# Patient Record
Sex: FEMALE | Race: WHITE | NOT HISPANIC OR LATINO | URBAN - METROPOLITAN AREA
[De-identification: names, ages, dates, MRNs, and addresses within clinical notes are randomized per-mention and may not be internally consistent; named-entity substitution may affect disease eponyms.]

---

## 2017-08-13 ENCOUNTER — INPATIENT (INPATIENT)
Facility: HOSPITAL | Age: 57
LOS: 6 days | Discharge: HOME | End: 2017-08-20
Attending: SURGERY

## 2017-08-13 DIAGNOSIS — Z98.890 OTHER SPECIFIED POSTPROCEDURAL STATES: ICD-10-CM

## 2017-08-13 DIAGNOSIS — F41.9 ANXIETY DISORDER, UNSPECIFIED: ICD-10-CM

## 2017-08-13 DIAGNOSIS — K80.21 CALCULUS OF GALLBLADDER WITHOUT CHOLECYSTITIS WITH OBSTRUCTION: ICD-10-CM

## 2017-08-21 ENCOUNTER — TRANSCRIPTION ENCOUNTER (OUTPATIENT)
Age: 57
End: 2017-08-21

## 2017-08-21 PROBLEM — Z00.00 ENCOUNTER FOR PREVENTIVE HEALTH EXAMINATION: Status: ACTIVE | Noted: 2017-08-21

## 2017-08-23 DIAGNOSIS — E03.9 HYPOTHYROIDISM, UNSPECIFIED: ICD-10-CM

## 2017-08-23 DIAGNOSIS — K80.64 CALCULUS OF GALLBLADDER AND BILE DUCT WITH CHRONIC CHOLECYSTITIS WITHOUT OBSTRUCTION: ICD-10-CM

## 2017-08-23 DIAGNOSIS — K80.65 CALCULUS OF GALLBLADDER AND BILE DUCT WITH CHRONIC CHOLECYSTITIS WITH OBSTRUCTION: ICD-10-CM

## 2017-08-23 DIAGNOSIS — F41.9 ANXIETY DISORDER, UNSPECIFIED: ICD-10-CM

## 2017-08-23 DIAGNOSIS — Z72.0 TOBACCO USE: ICD-10-CM

## 2017-08-23 DIAGNOSIS — E66.01 MORBID (SEVERE) OBESITY DUE TO EXCESS CALORIES: ICD-10-CM

## 2017-08-24 DIAGNOSIS — K80.20 CALCULUS OF GALLBLADDER WITHOUT CHOLECYSTITIS WITHOUT OBSTRUCTION: ICD-10-CM

## 2017-08-25 ENCOUNTER — APPOINTMENT (OUTPATIENT)
Dept: SURGERY | Facility: CLINIC | Age: 57
End: 2017-08-25
Payer: COMMERCIAL

## 2017-08-25 VITALS
WEIGHT: 247 LBS | BODY MASS INDEX: 46.63 KG/M2 | DIASTOLIC BLOOD PRESSURE: 82 MMHG | HEIGHT: 61 IN | SYSTOLIC BLOOD PRESSURE: 156 MMHG

## 2017-08-25 PROCEDURE — 99024 POSTOP FOLLOW-UP VISIT: CPT

## 2018-12-09 ENCOUNTER — EMERGENCY (EMERGENCY)
Facility: HOSPITAL | Age: 58
LOS: 0 days | Discharge: HOME | End: 2018-12-09
Admitting: PHYSICIAN ASSISTANT

## 2018-12-09 VITALS
OXYGEN SATURATION: 98 % | DIASTOLIC BLOOD PRESSURE: 60 MMHG | RESPIRATION RATE: 16 BRPM | SYSTOLIC BLOOD PRESSURE: 125 MMHG | HEART RATE: 71 BPM | TEMPERATURE: 97 F

## 2018-12-09 VITALS
OXYGEN SATURATION: 96 % | RESPIRATION RATE: 20 BRPM | HEART RATE: 84 BPM | SYSTOLIC BLOOD PRESSURE: 176 MMHG | TEMPERATURE: 98 F | DIASTOLIC BLOOD PRESSURE: 75 MMHG
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
## 2018-12-09 DIAGNOSIS — T49.4X5A ADVERSE EFFECT OF KERATOLYTICS, KERATOPLASTICS, AND OTHER HAIR TREATMENT DRUGS AND PREPARATIONS, INITIAL ENCOUNTER: ICD-10-CM
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
## 2018-12-09 DIAGNOSIS — I10 ESSENTIAL (PRIMARY) HYPERTENSION: ICD-10-CM
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
## 2018-12-09 DIAGNOSIS — R21 RASH AND OTHER NONSPECIFIC SKIN ERUPTION: ICD-10-CM

## 2018-12-09 RX ORDER — FAMOTIDINE 10 MG/ML
20 INJECTION INTRAVENOUS DAILY
Qty: 0 | Refills: 0 | Status: DISCONTINUED | OUTPATIENT
Start: 2018-12-09 | End: 2018-12-09

## 2018-12-09 RX ORDER — DIPHENHYDRAMINE HCL 50 MG
50 CAPSULE ORAL EVERY 6 HOURS
Qty: 0 | Refills: 0 | Status: DISCONTINUED | OUTPATIENT
Start: 2018-12-09 | End: 2018-12-09

## 2018-12-09 RX ORDER — FAMOTIDINE 10 MG/ML
1 INJECTION INTRAVENOUS
Qty: 10 | Refills: 0
Start: 2018-12-09 | End: 2018-12-18

## 2018-12-09 RX ADMIN — FAMOTIDINE 20 MILLIGRAM(S): 10 INJECTION INTRAVENOUS at 22:40

## 2018-12-09 RX ADMIN — Medication 60 MILLIGRAM(S): at 22:40

## 2018-12-09 RX ADMIN — Medication 50 MILLIGRAM(S): at 22:40

## 2018-12-09 NOTE — ED PROVIDER NOTE - NS ED ROS FT
Constitutional: no fever, chills, no recent weight loss, change in appetite or malaise  Eyes: no redness/discharge/pain/vision changes  ENT: no rhinorrhea/ear pain/sore throat  Cardiac: No chest pain, SOB or edema.  Respiratory: No cough or respiratory distress  GI: No nausea, vomiting, diarrhea or abdominal pain.  : No dysuria, frequency, urgency or hematuria  MS: no pain to back or extremities, no loss of ROM, no weakness  Neuro: No headache or weakness. No LOC.  Skin: see hpi  Except as documented in the HPI, all other systems are negative.

## 2018-12-09 NOTE — ED ADULT NURSE NOTE - OBJECTIVE STATEMENT
Pt states she dyed her hair yesterday morning and every since she has been itchy. Pt presents with a red blotchy rash throughout her body and blisters on her scalp.

## 2018-12-09 NOTE — ED PROVIDER NOTE - NSFOLLOWUPINSTRUCTIONS_ED_ALL_ED_FT
Rash    A rash is a change in the color of the skin. A rash can also change the way your skin feels. There are many different conditions and factors that can cause a rash, most of which are not dangerous. Make sure to follow up with your primary care physician or a dermatologist as instructed by your health care provider.    SEEK IMMEDIATE MEDICAL CARE IF YOU HAVE ANY OF THE FOLLOWING SYMPTOMS: fever, blisters, a rash inside your mouth, vaginal or anal pain, or altered mental status.      Allergic Reaction    An allergic reaction is an abnormal reaction to a substance (allergen) by the body's defense system. Common allergens include medicines, food, insect bites or stings, and blood products. The body releases certain proteins into the blood that can cause a variety of symptoms such as an itchy rash, wheezing, swelling of the face/lips/tongue/throat, abdominal pain, nausea or vomiting. An allergic reaction is usually treated with medication. If your health care provider prescribed you an epinephrine injection device, make sure to keep it with you at all times.    SEEK IMMEDIATE MEDICAL CARE IF YOU HAVE THE FOLLOWING SYMPTOMS: allergic reaction severe enough that required you to use epinephrine, tightness in your chest, swelling around your lips/tongue/throat, abdominal pain, vomiting or diarrhea, or lightheadedness/dizziness. These symptoms may represent a serious problem that is an emergency. Do not wait to see if the symptoms will go away. Use your auto-injector pen or anaphylaxis kit as you have been instructed, and get medical help right away. Call your local emergency services (911 in the [place] Do not drive yourself to the hospital.

## 2018-12-09 NOTE — ED PROVIDER NOTE - PROGRESS NOTE DETAILS
side effects of steroids discussed. risk for GI upset. PUD, gerd, gastrities, bleeding explained.  take steroid w food, may use otc prilosec if GI upset.  d/c use if intolerable s/e , ie pain, psychosis  Well appearing, non-toxic appearance with no evidence to suggest a more serious acute issue other than findings.  no indication for epi based on exam . The treatment plan and instructions for after care were reviewed prior to office discharge, pt verbalized understanding of plan of care, proper use of prescription /OTC meds (prednisone, pepcid and benadryl) and reasons to return to office or seek additional medical evaluation for ongoing or worsening complaints

## 2018-12-09 NOTE — ED ADULT NURSE NOTE - NSIMPLEMENTINTERV_GEN_ALL_ED
Implemented All Universal Safety Interventions:  [place] to call system. Call bell, personal items and telephone within reach. Instruct patient to call for assistance. Room bathroom lighting operational. Non-slip footwear when patient is off stretcher. Physically safe environment: no spills, clutter or unnecessary equipment. Stretcher in lowest position, wheels locked, appropriate side rails in place.

## 2018-12-09 NOTE — ED PROVIDER NOTE - CARE PROVIDER_API CALL
Kishore Parra), Dermatology; Internal Medicine  [address] [place] [address]  Phone: [phone]  Fax: [phone]    Blank Worley), Allergy and Immunology; Internal Medicine  [address] [place] [address]  Phone: [phone]  Fax: [phone]

## 2018-12-09 NOTE — ED PROVIDER NOTE - OBJECTIVE STATEMENT
allergic rxn poss due to hair dye  used new hair dye otc yesterday, has been having itchy rash to scalp and now entire body  Denies fever/chill/HA/dizziness/chest pain/palpitation/sob/abd pain/n/v/d/ black stool/bloody stool/urinary sxs

## 2018-12-09 NOTE — ED PROVIDER NOTE - PHYSICAL EXAMINATION
CONSTITUTIONAL: Well-appearing; well-nourished; in no apparent distress.   EYES: PERRL; EOM intact.   ENT: normal nose; no rhinorrhea; normal pharynx with no tonsillar hypertrophy.   CARDIOVASCULAR: Normal S1, S2; no murmurs, rubs, or gallops.   RESPIRATORY: Normal chest excursion with respiration; breath sounds clear and equal bilaterally; no wheezes, rhonchi, or rales.  GI/: Normal bowel sounds; non-distended; non-tender; no palpable organomegaly.   SKIN: diffuse urticarial rash to body and scalp; otherwise normal for age and race; warm; dry; good turgor  NEURO/PSYCH: A & O x 4; grossly unremarkable. mood and manner are appropriate. Grooming and personal hygiene are appropriate. No apparent thoughts of harm to self or others.

## 2018-12-17 ENCOUNTER — EMERGENCY (EMERGENCY)
Facility: HOSPITAL | Age: 58
LOS: 0 days | Discharge: HOME | End: 2018-12-17
Attending: EMERGENCY MEDICINE | Admitting: EMERGENCY MEDICINE

## 2018-12-17 VITALS
SYSTOLIC BLOOD PRESSURE: 196 MMHG | TEMPERATURE: 97 F | HEART RATE: 85 BPM | OXYGEN SATURATION: 99 % | DIASTOLIC BLOOD PRESSURE: 92 MMHG | RESPIRATION RATE: 17 BRPM

## 2018-12-17 DIAGNOSIS — Z87.891 PERSONAL HISTORY OF NICOTINE DEPENDENCE: ICD-10-CM

## 2018-12-17 DIAGNOSIS — R21 RASH AND OTHER NONSPECIFIC SKIN ERUPTION: ICD-10-CM

## 2018-12-17 DIAGNOSIS — L73.9 FOLLICULAR DISORDER, UNSPECIFIED: ICD-10-CM

## 2018-12-17 DIAGNOSIS — Z79.899 OTHER LONG TERM (CURRENT) DRUG THERAPY: ICD-10-CM

## 2018-12-17 NOTE — ED PROVIDER NOTE - PHYSICAL EXAMINATION
VITAL SIGNS: I have reviewed nursing notes and confirm.  CONSTITUTIONAL: Well-developed; well-nourished; in no acute distress. pt comfortable.  SKIN+ Scalp folliculitis, no edema, no pustular discharge, no forehead or MM involvement, remainder of skin exam is warm and dry, no acute rash.  HEAD: Normocephalic; atraumatic.  EYES:  EOM intact; conjunctiva and sclera clear.  ENT: No nasal discharge; airway clear. moist oral mucosa;   NECK: Supple; non tender.  CARD: S1, S2 normal; no murmurs, gallops, or rubs. Regular rate and rhythm. posterior tibial and radial pulses 2+  RESP: No wheezes, rales or rhonchi. cta b/l. no use of accessory muscles. no retractions  EXT: Normal ROM. No  cyanosis or edema.  LYMPH: No acute cervical adenopathy.  NEURO: Alert, oriented, grossly unremarkable.    PSYCH: Cooperative, appropriate.

## 2018-12-17 NOTE — ED PROVIDER NOTE - OBJECTIVE STATEMENT
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 No cp/sob, no n/v/d, no fever, no loc. Patient has an appointment with dermatologist again on Wednesday. patient has been give Minocycline.

## 2018-12-17 NOTE — ED PROVIDER NOTE - NS ED ROS FT
Constitutional: (-) fever  Eyes/ENT: (-) blurry vision, (-) epistaxis  Cardiovascular: (-) chest pain, (-) syncope  Respiratory: (-) cough, (-) shortness of breath  Gastrointestinal: (-) vomiting, (-) diarrhea  Musculoskeletal: (-) neck pain, (-) back pain, (-) joint pain  Integumentary: + rash, (-) edema  Neurological: (-) headache, (-) altered mental status  Psychiatric: (-) hallucinations  Allergic/Immunologic: (-) pruritus

## 2018-12-17 NOTE — ED PROVIDER NOTE - ATTENDING CONTRIBUTION TO CARE
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 No cp/sob, no n/v/d, no fever, no loc. Patient has an appointment with dermatologist again on Wednesday. patient has been give Minocycline.     CONSTITUTIONAL: Well-developed; well-nourished; in no acute distress. Sitting up and providing appropriate history and physical examination  SKIN: + Scalp folliculitis, no edema, no pustular discharge, no forehead or MM involvement, remainder of skin exam is warm and dry, no acute rash.  HEAD: Normocephalic; atraumatic.  EYES: PERRL, 3 mm bilateral, no nystagmus, EOM intact; conjunctiva and sclera clear.  ENT: No nasal discharge; airway clear.  NECK: Supple; non tender. + full passive ROM in all directions. No JVD  CARD: S1, S2 normal; no murmurs, gallops, or rubs. Regular rate and rhythm. + Symmetric Strong Pulses  RESP: No wheezes, rales or rhonchi. Good air movement bilaterally  ABD: soft; non-distended; non-tender. No Rebound, No Guarding, No signs of peritonitis, No CVA tenderness. No pulsatile abdominal mass. + Strong and Symmetric Pulses  EXT: Normal ROM. No clubbing, cyanosis or edema. Dp and Pt Pulses intact. Cap refill less than 3 seconds  NEURO: CN 2-12 intact, normal finger to nose, normal romberg, stable gait, no sensory or motor deficits, Alert, oriented, grossly unremarkable. No Focal deficits. GCS 15. NIH 0      Patient will continue with treatment and will follow with dermatologist on Wedsnday.   I have fully discussed the medical management and delivery of care with the patient. I have discussed any available labs, imaging and treatment options with the patient. Patient confirms understanding and has been given detailed return precautions. Patient instructed to return to the ED should symptoms persist or worsen. Patient has demonstrated capacity and has verbalized understanding. Patient is well appearing upon discharge.

## 2018-12-17 NOTE — ED PROVIDER NOTE - NSFOLLOWUPINSTRUCTIONS_ED_ALL_ED_FT
Patient to be discharged from ED. Any available test results were discussed with patient and/or family. Verbal instructions given, including instructions to return to ED immediately for any new, worsening, or concerning symptoms. Patient endorsed understanding. Written discharge instructions additionally given, including follow-up plan.    Please follow up with your dermatologist.

## 2018-12-18 PROBLEM — I10 ESSENTIAL (PRIMARY) HYPERTENSION: Chronic | Status: ACTIVE | Noted: 2018-12-09

## 2019-05-13 ENCOUNTER — TRANSCRIPTION ENCOUNTER (OUTPATIENT)
Age: 59
End: 2019-05-13

## 2019-05-14 ENCOUNTER — APPOINTMENT (OUTPATIENT)
Age: 59
End: 2019-05-14
Payer: COMMERCIAL

## 2019-05-14 VITALS — BODY MASS INDEX: 47.24 KG/M2 | WEIGHT: 250 LBS | DIASTOLIC BLOOD PRESSURE: 85 MMHG | SYSTOLIC BLOOD PRESSURE: 135 MMHG

## 2019-05-14 DIAGNOSIS — R10.32 LEFT LOWER QUADRANT PAIN: ICD-10-CM

## 2019-05-14 PROCEDURE — 99213 OFFICE O/P EST LOW 20 MIN: CPT

## 2019-05-14 RX ORDER — SULFAMETHOXAZOLE AND TRIMETHOPRIM 400; 80 MG/1; MG/1
400-80 TABLET ORAL
Refills: 0 | Status: ACTIVE | COMMUNITY

## 2019-05-14 NOTE — PLAN
[FreeTextEntry1] : Patient has left groin pain secondary to infection in the groin and cellulitis. Patient has 2 openings in that area and recently the abscess opened up and drained. She was seen in an outpatient facility where cultures were taken and patient was placed on Bactrim. On examination there are 2 small openings that are draining. There is no collection of pus underneath. Patient was advised to continue local care and Bactrim. She is also going to see infectious disease specialist. Followup in the office as needed.

## 2019-05-14 NOTE — REASON FOR VISIT
[Consultation] : a consultation visit [Follow-Up] : a follow-up visit [FreeTextEntry1] : Marianna is being seen today for an infected mass

## 2019-05-14 NOTE — REVIEW OF SYSTEMS
[Chills] : no chills [Fever] : no fever [Chest Pain] : no chest pain [Shortness Of Breath] : no shortness of breath [Abdominal Pain] : no abdominal pain [Vomiting] : no vomiting [Constipation] : no constipation [Dizziness] : no dizziness [Diarrhea] : no diarrhea

## 2019-05-14 NOTE — REVIEW OF SYSTEMS
[Chills] : no chills [Fever] : no fever [Shortness Of Breath] : no shortness of breath [Chest Pain] : no chest pain [Abdominal Pain] : no abdominal pain [Vomiting] : no vomiting [Constipation] : no constipation [Diarrhea] : no diarrhea [Dizziness] : no dizziness

## 2020-09-01 ENCOUNTER — TRANSCRIPTION ENCOUNTER (OUTPATIENT)
Age: 60
End: 2020-09-01

## 2020-09-26 ENCOUNTER — EMERGENCY (EMERGENCY)
Facility: HOSPITAL | Age: 60
LOS: 0 days | Discharge: HOME | End: 2020-09-26
Attending: EMERGENCY MEDICINE | Admitting: EMERGENCY MEDICINE
Payer: COMMERCIAL

## 2020-09-26 VITALS
TEMPERATURE: 99 F | DIASTOLIC BLOOD PRESSURE: 75 MMHG | HEART RATE: 82 BPM | OXYGEN SATURATION: 96 % | SYSTOLIC BLOOD PRESSURE: 171 MMHG | RESPIRATION RATE: 16 BRPM

## 2020-09-26 DIAGNOSIS — Z79.52 LONG TERM (CURRENT) USE OF SYSTEMIC STEROIDS: ICD-10-CM

## 2020-09-26 DIAGNOSIS — Z79.899 OTHER LONG TERM (CURRENT) DRUG THERAPY: ICD-10-CM

## 2020-09-26 DIAGNOSIS — K61.0 ANAL ABSCESS: ICD-10-CM

## 2020-09-26 DIAGNOSIS — K62.89 OTHER SPECIFIED DISEASES OF ANUS AND RECTUM: ICD-10-CM

## 2020-09-26 DIAGNOSIS — I10 ESSENTIAL (PRIMARY) HYPERTENSION: ICD-10-CM

## 2020-09-26 DIAGNOSIS — E03.9 HYPOTHYROIDISM, UNSPECIFIED: ICD-10-CM

## 2020-09-26 DIAGNOSIS — F17.200 NICOTINE DEPENDENCE, UNSPECIFIED, UNCOMPLICATED: ICD-10-CM

## 2020-09-26 DIAGNOSIS — K59.00 CONSTIPATION, UNSPECIFIED: ICD-10-CM

## 2020-09-26 LAB
ALBUMIN SERPL ELPH-MCNC: 4.2 G/DL — SIGNIFICANT CHANGE UP (ref 3.5–5.2)
ALP SERPL-CCNC: 130 U/L — HIGH (ref 30–115)
ALT FLD-CCNC: 22 U/L — SIGNIFICANT CHANGE UP (ref 0–41)
ANION GAP SERPL CALC-SCNC: 10 MMOL/L — SIGNIFICANT CHANGE UP (ref 7–14)
APPEARANCE UR: CLEAR — SIGNIFICANT CHANGE UP
APTT BLD: 33.3 SEC — SIGNIFICANT CHANGE UP (ref 27–39.2)
AST SERPL-CCNC: 33 U/L — SIGNIFICANT CHANGE UP (ref 0–41)
BACTERIA # UR AUTO: NEGATIVE — SIGNIFICANT CHANGE UP
BASOPHILS # BLD AUTO: 0.06 K/UL — SIGNIFICANT CHANGE UP (ref 0–0.2)
BASOPHILS NFR BLD AUTO: 0.5 % — SIGNIFICANT CHANGE UP (ref 0–1)
BILIRUB SERPL-MCNC: 0.4 MG/DL — SIGNIFICANT CHANGE UP (ref 0.2–1.2)
BILIRUB UR-MCNC: NEGATIVE — SIGNIFICANT CHANGE UP
BLD GP AB SCN SERPL QL: SIGNIFICANT CHANGE UP
BUN SERPL-MCNC: 21 MG/DL — HIGH (ref 10–20)
CALCIUM SERPL-MCNC: 9.3 MG/DL — SIGNIFICANT CHANGE UP (ref 8.5–10.1)
CHLORIDE SERPL-SCNC: 96 MMOL/L — LOW (ref 98–110)
CO2 SERPL-SCNC: 26 MMOL/L — SIGNIFICANT CHANGE UP (ref 17–32)
COLOR SPEC: SIGNIFICANT CHANGE UP
CREAT SERPL-MCNC: 1 MG/DL — SIGNIFICANT CHANGE UP (ref 0.7–1.5)
DIFF PNL FLD: ABNORMAL
EOSINOPHIL # BLD AUTO: 0.04 K/UL — SIGNIFICANT CHANGE UP (ref 0–0.7)
EOSINOPHIL NFR BLD AUTO: 0.3 % — SIGNIFICANT CHANGE UP (ref 0–8)
EPI CELLS # UR: 2 /HPF — SIGNIFICANT CHANGE UP (ref 0–5)
GLUCOSE SERPL-MCNC: 112 MG/DL — HIGH (ref 70–99)
GLUCOSE UR QL: NEGATIVE — SIGNIFICANT CHANGE UP
HCT VFR BLD CALC: 41.4 % — SIGNIFICANT CHANGE UP (ref 37–47)
HGB BLD-MCNC: 13.4 G/DL — SIGNIFICANT CHANGE UP (ref 12–16)
HYALINE CASTS # UR AUTO: 1 /LPF — SIGNIFICANT CHANGE UP (ref 0–7)
IMM GRANULOCYTES NFR BLD AUTO: 0.5 % — HIGH (ref 0.1–0.3)
INR BLD: 1.03 RATIO — SIGNIFICANT CHANGE UP (ref 0.65–1.3)
KETONES UR-MCNC: NEGATIVE — SIGNIFICANT CHANGE UP
LACTATE SERPL-SCNC: 0.8 MMOL/L — SIGNIFICANT CHANGE UP (ref 0.7–2)
LEUKOCYTE ESTERASE UR-ACNC: NEGATIVE — SIGNIFICANT CHANGE UP
LYMPHOCYTES # BLD AUTO: 1.64 K/UL — SIGNIFICANT CHANGE UP (ref 1.2–3.4)
LYMPHOCYTES # BLD AUTO: 13.1 % — LOW (ref 20.5–51.1)
MCHC RBC-ENTMCNC: 28.9 PG — SIGNIFICANT CHANGE UP (ref 27–31)
MCHC RBC-ENTMCNC: 32.4 G/DL — SIGNIFICANT CHANGE UP (ref 32–37)
MCV RBC AUTO: 89.2 FL — SIGNIFICANT CHANGE UP (ref 81–99)
MONOCYTES # BLD AUTO: 1.18 K/UL — HIGH (ref 0.1–0.6)
MONOCYTES NFR BLD AUTO: 9.4 % — HIGH (ref 1.7–9.3)
NEUTROPHILS # BLD AUTO: 9.58 K/UL — HIGH (ref 1.4–6.5)
NEUTROPHILS NFR BLD AUTO: 76.2 % — HIGH (ref 42.2–75.2)
NITRITE UR-MCNC: NEGATIVE — SIGNIFICANT CHANGE UP
NRBC # BLD: 0 /100 WBCS — SIGNIFICANT CHANGE UP (ref 0–0)
PH UR: 6 — SIGNIFICANT CHANGE UP (ref 5–8)
PLATELET # BLD AUTO: 284 K/UL — SIGNIFICANT CHANGE UP (ref 130–400)
POTASSIUM SERPL-MCNC: 5.1 MMOL/L — HIGH (ref 3.5–5)
POTASSIUM SERPL-SCNC: 5.1 MMOL/L — HIGH (ref 3.5–5)
PROT SERPL-MCNC: 6.9 G/DL — SIGNIFICANT CHANGE UP (ref 6–8)
PROT UR-MCNC: NEGATIVE — SIGNIFICANT CHANGE UP
PROTHROM AB SERPL-ACNC: 11.9 SEC — SIGNIFICANT CHANGE UP (ref 9.95–12.87)
RBC # BLD: 4.64 M/UL — SIGNIFICANT CHANGE UP (ref 4.2–5.4)
RBC # FLD: 14.9 % — HIGH (ref 11.5–14.5)
RBC CASTS # UR COMP ASSIST: 7 /HPF — HIGH (ref 0–4)
SODIUM SERPL-SCNC: 132 MMOL/L — LOW (ref 135–146)
SP GR SPEC: 1.01 — SIGNIFICANT CHANGE UP (ref 1.01–1.03)
UROBILINOGEN FLD QL: SIGNIFICANT CHANGE UP
WBC # BLD: 12.56 K/UL — HIGH (ref 4.8–10.8)
WBC # FLD AUTO: 12.56 K/UL — HIGH (ref 4.8–10.8)
WBC UR QL: 3 /HPF — SIGNIFICANT CHANGE UP (ref 0–5)

## 2020-09-26 PROCEDURE — 93010 ELECTROCARDIOGRAM REPORT: CPT

## 2020-09-26 PROCEDURE — 99285 EMERGENCY DEPT VISIT HI MDM: CPT

## 2020-09-26 RX ORDER — POLYETHYLENE GLYCOL 3350 17 G/17G
17 POWDER, FOR SOLUTION ORAL
Qty: 119 | Refills: 0
Start: 2020-09-26 | End: 2020-10-02

## 2020-09-26 RX ORDER — MORPHINE SULFATE 50 MG/1
4 CAPSULE, EXTENDED RELEASE ORAL ONCE
Refills: 0 | Status: DISCONTINUED | OUTPATIENT
Start: 2020-09-26 | End: 2020-09-26

## 2020-09-26 RX ORDER — ACETAMINOPHEN WITH CODEINE 300MG-30MG
1 TABLET ORAL
Qty: 28 | Refills: 0
Start: 2020-09-26 | End: 2020-10-02

## 2020-09-26 RX ORDER — SODIUM CHLORIDE 9 MG/ML
1000 INJECTION INTRAMUSCULAR; INTRAVENOUS; SUBCUTANEOUS ONCE
Refills: 0 | Status: COMPLETED | OUTPATIENT
Start: 2020-09-26 | End: 2020-09-26

## 2020-09-26 RX ADMIN — SODIUM CHLORIDE 2000 MILLILITER(S): 9 INJECTION INTRAMUSCULAR; INTRAVENOUS; SUBCUTANEOUS at 11:33

## 2020-09-26 RX ADMIN — MORPHINE SULFATE 4 MILLIGRAM(S): 50 CAPSULE, EXTENDED RELEASE ORAL at 11:33

## 2020-09-26 RX ADMIN — MORPHINE SULFATE 4 MILLIGRAM(S): 50 CAPSULE, EXTENDED RELEASE ORAL at 14:13

## 2020-09-26 NOTE — ED PROVIDER NOTE - OBJECTIVE STATEMENT
61 y/o F with pmh of htn, hypothyroidism presenting for perianal abscess. Pt. has been experiencing pain around her anus x1 week. Was told by her GI doctor (Dr. Warner) that she had an abscess and was placed on abx (cipro and flagyl) on 9/23. Had an MRI of anal canal performed yesterday and was called back this morning by Dr. Warner, told to come to the hospital because she has an abscess that would need surgery. Has been taking Vicodin for pain. Last took 10mg of Vicodin this morning. Denies any bleeding or drainage from the area, endorses intermittent constipation and diarrhea, no abdominal pain, no blood in stool.    MRI report:  3.5 x 2.0 x 2.5 cm left posterior perianal abscess. No definite fistula identified. 59 y/o F with pmh of htn, hypothyroidism presenting for perianal abscess. Pt. has been experiencing pain around her anus x1 week. Was told by her GI doctor (Dr. Warner) that she had an abscess and was placed on abx (cipro and flagyl) on 9/23. Had an MRI of anal canal performed yesterday and was called back this morning by Dr. Warner, told to come to the hospital because she has an abscess that would need surgery. Has been taking Vicodin for pain. Last took 10mg of Vicodin this morning at 4am. Denies any bleeding or drainage from the area, endorses intermittent constipation and diarrhea, no abdominal pain, no blood in stool. No fever/chills, no n/v.    MRI report:  3.5 x 2.0 x 2.5 cm left posterior perianal abscess. No definite fistula identified.

## 2020-09-26 NOTE — ED PROVIDER NOTE - CARE PROVIDER_API CALL
Pedrito Barboza)  ColonRectal Surgery; Surgery  [address] [facility], [address] [place] [address]  Phone: [phone]  Fax: [phone]  Follow Up Time: 1-3 Days

## 2020-09-26 NOTE — ED ADULT NURSE NOTE - NSIMPLEMENTINTERV_GEN_ALL_ED
Implemented All Universal Safety Interventions:  [place] to call system. Call bell, personal items and telephone within reach. Instruct patient to call for assistance. Room bathroom lighting operational. Non-slip footwear when patient is off stretcher. Physically safe environment: no spills, clutter or unnecessary equipment. Stretcher in lowest position, wheels locked, appropriate side rails in place.

## 2020-09-26 NOTE — ED ADULT TRIAGE NOTE - CHIEF COMPLAINT QUOTE
"I went to my doctor and he sent me for MRI and I have bad discomfort in my anal and he said I have an abscess and to come to the hospital that I need surgery"

## 2020-09-26 NOTE — ED PROVIDER NOTE - PROGRESS NOTE DETAILS
KARL (resident): surgery consulted for perianal abscess, will come down to see patient in the ED. KARL (resident): spoke with Dr. Warner (patient's GI doctor)- confirmed results from MRI and that she would need to see surgery for the abscess.

## 2020-09-26 NOTE — ED PROVIDER NOTE - CLINICAL SUMMARY MEDICAL DECISION MAKING FREE TEXT BOX
pt with history above presenting with pain to anal region x1wk. went to GI Dr. Warner this week, placed on cipro/flagyl, had MR done, called back today for perianal abscess, told to come in for i&d. no fevers/chills, drainage. +pain on defecation. Well appearing, NAD, non toxic. NCAT PERRLA EOMI neck supple non tender cta bl normal wob rrr abdomen s nt nd no rebound no guarding WWPx4 neuro non focal. +perianal abscess posteriorly. pt refusing TREVER. labs reviewed. sp i&d by surgery. cx sent. Aware of all results, given a copy of all available results, comfortable with discharge and follow-up outpatient, strict return precautions given. Endorses understanding of all of this and aware that they can return at any time for new or concerning symptoms. No further questions or concerns at this time

## 2020-09-26 NOTE — ED PROVIDER NOTE - GENITOURINARY, MLM
3.5 x 2.0 x 2.5 cm anal abscess, indurated. No bleeding, no oozing from abscess site. Patient refused TREVER.

## 2020-09-26 NOTE — ED PROVIDER NOTE - NS ED ROS FT
Eyes:  No visual changes, eye pain or discharge.  ENMT:  No hearing changes, pain, discharge or infections. No neck pain or stiffness.  Cardiac:  No chest pain, SOB or edema. No chest pain with exertion.  Respiratory:  No cough or respiratory distress.  GI:  No nausea, vomiting, diarrhea or abdominal pain. No blood in stool.  :  No dysuria, frequency or burning.  MS:  No myalgia, muscle weakness, joint pain or back pain.  Neuro:  No headache or weakness.  No LOC.  Skin:  No skin rash.   Endocrine: +hx of hypothyroidism

## 2020-09-26 NOTE — CONSULT NOTE ADULT - ASSESSMENT
Patient is a 59 y/o female with PMHx of HTN, Hypothyroid, and prior MRSA infection whom presented to [facility] with rectal pain. She notes she has been having pain around her anus for the last week. Pain started off as pressure like and gradually became worse. Pain is sharp, 10/10, and without radiation, involving where whole anus. She notes no alleviating  factors ( She attempted otc pain medications, soaks, as well as milk of magnesia to loosen stool). Made worse with directly applying pressure as well as during defecation.  She had seen her GI Dr. Warner whom started Cipro and Flagyl and ordered MRI of the anus. Patient was called with the results and told to come to the ED for drainage. Area of fluctuance noted and area prepped. Patient with spouse present gave verbal consent to perform bedside incision and drainage. Area was prepped with betadine, and Lidocaine injected locally. Incision was made and culture was sent. We requested to extend the incision but patient refused. Area was dressed.    Anorectal Abscess  - S/P InD along with obtaining culture   - Would advise PO Bactrim given MRSA Hx  - Miralax 17gram twice daily  - Tylenol #3 with codeine every 8 hours for pain  - Patient to follow up with Dr Elia Barboza for outpatient follow up Monday 9/28   Patient is a 59 y/o female with PMHx of HTN, Hypothyroid, and prior MRSA infection whom presented to [facility] with rectal pain. She notes she has been having pain around her anus for the last week. Pain started off as pressure like and gradually became worse. Pain is sharp, 10/10, and without radiation, involving where whole anus. She notes no alleviating  factors ( She attempted otc pain medications, soaks, as well as milk of magnesia to loosen stool). Made worse with directly applying pressure as well as during defecation.  She had seen her GI Dr. Warner whom started Cipro and Flagyl and ordered MRI of the anus. Patient was called with the results and told to come to the ED for drainage. Area of fluctuance noted and area prepped. Patient with spouse present gave verbal consent to perform bedside incision and drainage. Area was prepped with betadine, and Lidocaine injected locally. Incision was made and culture was sent. We requested to extend the incision but patient refused. Area was dressed.    Anorectal Abscess  - S/P InD along with obtaining culture   - Would advise PO Bactrim given MRSA Hx  - Miralax 17gram twice daily  - Tylenol #3 with codeine every 8 hours for pain  - Patient to follow up with Dr Elia Barboza for outpatient follow up Monday 9/28    Senior Resident Addendum  As above, patient presenting sent in by primary GI physician for perianal abscess. Initial incision performed however patient refused further incision, risks and benefits discuss, patient prefers to continue abx with o/p followup. Plan d/w ED, patient, Dr. Barboza, surgical team.

## 2020-09-26 NOTE — ED PROVIDER NOTE - NSFOLLOWUPINSTRUCTIONS_ED_ALL_ED_FT
Abscess    An abscess is an infected area that contains a collection of pus and debris. It can occur in almost any part of the body and occurs when the tissue gets infection. Symptoms include a painful mass that is red, warm, tender that might break open and have drainage. If your health care provider gave you antibiotics make sure to take the full course and do not stop even if feeling better.     SEEK MEDICAL CARE IF YOU HAVE THE FOLLOWING SYMPTOMS: chills, fever, muscle aches, or red streaking from the area.    PLEASE RETURN TO THE ED FOR FEVER, CHILLS, OR INCREASED PAIN AT THE AFFECTED AREA.

## 2020-09-26 NOTE — CONSULT NOTE ADULT - SUBJECTIVE AND OBJECTIVE BOX
Patient is a 59 y/o female with PMHx of HTN, Hypothyroid, and prior MRSA infection whom presented to [facility] with rectal pain. She notes she has been having pain around her anus for the last week. Pain started off as pressure like and gradually became worse. Pain is sharp, 10/10, and without radiation, involving where whole anus. She notes no alleviating  factors ( She attempted otc pain medications, soaks, as well as milk of magnesia to loosen stool). Made worse with directly applying pressure as well as during defecation.  She had seen her GI Dr. Warner whom started Cipro and Flagyl and ordered MRI of the anus. Patient was called with the results and told to come to the ED for drainage. Patient notes some constipation and pain on defecation but is having stools. She notes no fever, chills, anal drainage, difficulty urinating, or recent infections.      PAST MEDICAL & SURGICAL HISTORY:  HTN (hypertension)  Hypothyroid  MRSA wound infection    Social History  Admits to Tobacco use  Denies Current ETOH use  Denies Current Illicit Drug use       Allergies  No Known Allergies    Review of Systems  General:  Denies Fatigue, Denies Fever, Denies Weakness ,Denies Weight Loss   HEENT: Denies Trouble Swallowing ,Denies  Sore Throat , Denies Change in hearing/vision/speech ,Denies Dizziness    Cardio: Denies  Chest Pain , Palpitations    Respiratory: Denies worsening of SOB, Denies Cough  Abdomen: See detailed HPI  Neuro: Denies Headache Denies Dizziness, Denies Paresthesias  MSK: Denies pain in Bones/Joints/Muscles   Psych: Patient denies depression, denies suicidal or homicidal ideations  Integ: Patient Denies rash, or new skin lesions       Vital Signs:  T(F): 98.7 (26 Sep 2020 09:53), Max: 98.7 (26 Sep 2020 09:53)  HR: 82 (26 Sep 2020 09:53) (82 - 82)  BP: 171/75 (26 Sep 2020 09:53) (171/75 - 171/75)  RR: 16 (26 Sep 2020 09:53) (16 - 16)  SpO2: 96% (26 Sep 2020 09:53) (96% - 96%)  Physical Exam  Gen: NAD  HEENT: NC/AT  Cardio: S1/S2   Resp: CTA B/L  Abdomen: Soft, ND/NT, anorectal abscess involving upper left corner of the anal area, fluctuance and induration palpated, external hemorrhoid noted   Neuro: AAOx3  Extremities: FROM x 4      LABS:                          13.4   12.56 )-----------( 284      ( 26 Sep 2020 11:26 )             41.4       Auto Neutrophil %: 76.2 % (09-26-20 @ 11:26)  Auto Immature Granulocyte %: 0.5 % (09-26-20 @ 11:26)    09-26    132<L>  |  96<L>  |  21<H>  ----------------------------<  112<H>  5.1<H>   |  26  |  1.0      Calcium, Total Serum: 9.3 mg/dL (09-26-20 @ 11:26)      LFTs:             6.9  | 0.4  | 33       ------------------[130     ( 26 Sep 2020 11:26 )  4.2  | x    | 22             Lactate, Blood: 0.8 mmol/L (09-26-20 @ 11:26)      Coags:     11.90  ----< 1.03    ( 26 Sep 2020 11:26 )     33.3

## 2020-09-26 NOTE — ED PROVIDER NOTE - PATIENT PORTAL LINK FT
You can access the FollowMyHealth Patient Portal offered by [facility] by registering at the following website: [email or web address]. By joining [facility]’s FollowMyHealth portal, you will also be able to view your health information using other applications (apps) compatible with our system.

## 2020-09-27 LAB
CULTURE RESULTS: SIGNIFICANT CHANGE UP
SPECIMEN SOURCE: SIGNIFICANT CHANGE UP

## 2020-09-28 ENCOUNTER — LABORATORY RESULT (OUTPATIENT)
Age: 60
End: 2020-09-28

## 2020-09-28 ENCOUNTER — APPOINTMENT (OUTPATIENT)
Dept: SURGERY | Facility: CLINIC | Age: 60
End: 2020-09-28
Payer: COMMERCIAL

## 2020-09-28 PROCEDURE — 46040 I&D ISCHIORCT&/PERIRCT ABSC: CPT

## 2020-09-28 PROCEDURE — 99213 OFFICE O/P EST LOW 20 MIN: CPT | Mod: 25,57

## 2020-09-29 RX ORDER — LISINOPRIL AND HYDROCHLOROTHIAZIDE TABLETS 20; 25 MG/1; MG/1
20-25 TABLET ORAL
Qty: 30 | Refills: 0 | Status: ACTIVE | COMMUNITY
Start: 2019-09-06

## 2020-09-29 RX ORDER — IPRATROPIUM BROMIDE 21 UG/1
0.03 SPRAY NASAL
Qty: 30 | Refills: 0 | Status: ACTIVE | COMMUNITY
Start: 2020-09-01

## 2020-09-29 RX ORDER — PREDNISONE 20 MG/1
20 TABLET ORAL
Qty: 5 | Refills: 0 | Status: ACTIVE | COMMUNITY
Start: 2020-09-01

## 2020-09-29 RX ORDER — MUPIROCIN 20 MG/G
2 OINTMENT TOPICAL
Qty: 22 | Refills: 0 | Status: ACTIVE | COMMUNITY
Start: 2019-05-23

## 2020-09-29 RX ORDER — LEVOTHYROXINE SODIUM 0.1 MG/1
100 TABLET ORAL
Qty: 90 | Refills: 0 | Status: ACTIVE | COMMUNITY
Start: 2020-07-13

## 2020-09-29 NOTE — PROCEDURE
[FreeTextEntry1] : I discussed at length the pathology of an ischiorectal abscess and the need for incision and drainage.  The patient was in agreement and signed surgical consent.\par \par The patient was positioned in the prone jacknife position for the procedure.  The area of prepped using alcohol swabs.  5mL of 1% lidocaine was injected in the skin overlying to anesthetize the area.  Once appropriate anesthesia was achieved, a 2x2cm cruciate incision was created.  Approximately 20mL of purulence was evacuated.  A culture was taken.  The wound was then irrigated and a dry gauze dressing was placed over top.  The patient tolerated the procedure well and there were no complications.\par

## 2020-09-29 NOTE — ASSESSMENT
[FreeTextEntry1] : 60F s/p incision and drainage of ischiorectal abscess\par \par I discussed the pathology with the patient and the possibility of developing a fistula.  She will keep the area clean and dry and complete her course of Bactrim.  We will see her back in 2 weeks.

## 2020-09-29 NOTE — CONSULT LETTER
[Dear  ___] : Dear  [unfilled], [Consult Letter:] : I had the pleasure of evaluating your patient, [unfilled]. [Please see my note below.] : Please see my note below. [Consult Closing:] : Thank you very much for allowing me to participate in the care of this patient.  If you have any questions, please do not hesitate to contact me. [FreeTextEntry3] : Sincerely,\par \par Pedrito Barboza MD, Colon and Rectal Surgery\par \par [employer] at [facility]\par [address] [place] [address]\par [facility], 3rd Floor\par [place], [place] [address]\par Tel [phone] ext 2\par Fax [phone]\par

## 2020-09-29 NOTE — HISTORY OF PRESENT ILLNESS
[FreeTextEntry1] : Patient is a 60F with PMH HTN, hypothyroid and previous MRSA infections who presents with perianal abscess.  Patient started having perianal pain about 1 week ago.  She was evlauted by Dr. Parsons who sent the patient for MRI.  MRI showed a 3cm perianal abscess.  Patient presented to [facility] for perianal abscess drainage however she was unable to tolerate bedside drainage. She requested discharge and was sent home on Bactrim. She presents today with continued pain.  Patient denies fevers, chills, nausea, vomiting, abdominal pain, constipation, diarrhea, blood in his stool or unexpected weight loss.  Patient denies a family history of colon cancer rectal cancer or inflammatory bowel disease.  Patients last colonoscopy was in 2019 by Dr. Parsons and was normal.  We do not have those results.\par

## 2020-09-29 NOTE — PHYSICAL EXAM
[Abdomen Masses] : No abdominal masses [Abdomen Tenderness] : ~T No ~M abdominal tenderness [No HSM] : no hepatosplenomegaly [None] : no anal fissures seen [Excoriation] : no perianal excoriation [Fistula] : no fistulas [Wart] : no warts [Ulcer ___ cm] : no ulcers [Pilonidal Cyst] : no pilonidal cysts [Tender, Swollen] : nontender, non-swollen [Thrombosed] : that was not thrombosed [Skin Tags] : residual hemorrhoidal skin tags were noted [Respiratory Effort] : normal respiratory effort [Normal Rate and Rhythm] : normal rate and rhythm [de-identified] : external examination shows erythema induration and fluctuance in the left posterolateral location. [de-identified] : TREVER and anoscopy not performed due to pain [de-identified] : awake, alert and in no acute distress

## 2020-10-01 LAB
CULTURE RESULTS: SIGNIFICANT CHANGE UP
CULTURE RESULTS: SIGNIFICANT CHANGE UP
SPECIMEN SOURCE: SIGNIFICANT CHANGE UP
SPECIMEN SOURCE: SIGNIFICANT CHANGE UP

## 2020-10-07 ENCOUNTER — APPOINTMENT (OUTPATIENT)
Dept: SURGERY | Facility: CLINIC | Age: 60
End: 2020-10-07
Payer: COMMERCIAL

## 2020-10-07 VITALS
DIASTOLIC BLOOD PRESSURE: 70 MMHG | BODY MASS INDEX: 47.01 KG/M2 | SYSTOLIC BLOOD PRESSURE: 130 MMHG | WEIGHT: 249 LBS | HEIGHT: 61 IN | HEART RATE: 75 BPM | TEMPERATURE: 97.9 F

## 2020-10-07 PROCEDURE — 99024 POSTOP FOLLOW-UP VISIT: CPT

## 2020-10-09 NOTE — PHYSICAL EXAM
[Abdomen Masses] : No abdominal masses [Abdomen Tenderness] : ~T No ~M abdominal tenderness [No HSM] : no hepatosplenomegaly [None] : no anal fissures seen [Excoriation] : no perianal excoriation [Fistula] : no fistulas [Wart] : no warts [Ulcer ___ cm] : no ulcers [Pilonidal Cyst] : no pilonidal cysts [Tender, Swollen] : nontender, non-swollen [Thrombosed] : that was not thrombosed [Skin Tags] : residual hemorrhoidal skin tags were noted [Respiratory Effort] : normal respiratory effort [Normal Rate and Rhythm] : normal rate and rhythm [de-identified] : external examination shows an incision in the left posterolateral location.  There is no erythema induration or purulence. There is a small amount of purulent drainage [de-identified] : TREVER and anoscopy not performed due to pain [de-identified] : awake, alert and in no acute distress

## 2020-10-09 NOTE — HISTORY OF PRESENT ILLNESS
[FreeTextEntry1] : Patient is a 60F with PMH HTN, hypothyroid and previous MRSA infections who presents for follow up of I and D of ischiorectal abscess. She was evaluated by Dr. Parsons for anal pain who sent the patient for MRI.  MRI showed a 3cm perianal abscess.  Patient presented to [facility] for perianal abscess drainage however she was unable to tolerate bedside drainage. She presented to our office on 9/28 and underwent incision and drainage in the office.  Cultures showed multiple organisms. She presents today for follow up.  Patient states her pain is much improved.  She continues to have  mild drainage.  Patient denies fevers, chills, nausea, vomiting, abdominal pain, constipation, diarrhea, blood in his stool or unexpected weight loss.  Patient denies a family history of colon cancer rectal cancer or inflammatory bowel disease.  Patients last colonoscopy was in 2019 by Dr. Parsons and was normal.  We do not have those results.\par

## 2020-10-09 NOTE — ASSESSMENT
[FreeTextEntry1] : 60F s/p incision and drainage of ischiorectal abscess\par \par Patient is recovering well.  We discussed keeping the area clean and dry.  We also discussed the possibility of forming a fistula.  We will see her back in 3 months.

## 2020-10-09 NOTE — CONSULT LETTER
[Dear  ___] : Dear  [unfilled], [Courtesy Letter:] : I had the pleasure of seeing your patient, [unfilled], in my office today. [Please see my note below.] : Please see my note below. [Consult Closing:] : Thank you very much for allowing me to participate in the care of this patient.  If you have any questions, please do not hesitate to contact me. [FreeTextEntry3] : Sincerely,\par \par Pedrito Barboza MD, Colon and Rectal Surgery\par \par [employer] at [facility]\par [address] [place] [address]\par [facility], 3rd Floor\par [place], [place] [address]\par Tel [phone] ext 2\par Fax [phone]\par

## 2020-12-01 ENCOUNTER — TRANSCRIPTION ENCOUNTER (OUTPATIENT)
Age: 60
End: 2020-12-01

## 2021-01-11 ENCOUNTER — APPOINTMENT (OUTPATIENT)
Dept: SURGERY | Facility: CLINIC | Age: 61
End: 2021-01-11
Payer: COMMERCIAL

## 2021-01-11 VITALS
TEMPERATURE: 96.5 F | HEART RATE: 81 BPM | BODY MASS INDEX: 45.88 KG/M2 | SYSTOLIC BLOOD PRESSURE: 122 MMHG | DIASTOLIC BLOOD PRESSURE: 68 MMHG | WEIGHT: 243 LBS | HEIGHT: 61 IN

## 2021-01-11 DIAGNOSIS — K61.39 OTHER ISCHIORECTAL ABSCESS: ICD-10-CM

## 2021-01-11 PROCEDURE — 99072 ADDL SUPL MATRL&STAF TM PHE: CPT

## 2021-01-11 PROCEDURE — 99213 OFFICE O/P EST LOW 20 MIN: CPT

## 2021-01-13 PROBLEM — K61.39 ISCHIORECTAL ABSCESS: Status: ACTIVE | Noted: 2020-09-29

## 2021-01-13 NOTE — PHYSICAL EXAM
[Abdomen Masses] : No abdominal masses [Abdomen Tenderness] : ~T No ~M abdominal tenderness [No HSM] : no hepatosplenomegaly [None] : no anal fissures seen [Excoriation] : no perianal excoriation [Fistula] : no fistulas [Wart] : no warts [Ulcer ___ cm] : no ulcers [Pilonidal Cyst] : no pilonidal cysts [Tender, Swollen] : nontender, non-swollen [Thrombosed] : that was not thrombosed [Skin Tags] : residual hemorrhoidal skin tags were noted [Respiratory Effort] : normal respiratory effort [Normal Rate and Rhythm] : normal rate and rhythm [de-identified] : external examination shows a healed incision in the left posterolateral location.  There is no erythema induration or purulence. [de-identified] : TREVER and anoscopy not performed due to pain [de-identified] : awake, alert and in no acute distress

## 2021-01-13 NOTE — CONSULT LETTER
[Dear  ___] : Dear  [unfilled], [Courtesy Letter:] : I had the pleasure of seeing your patient, [unfilled], in my office today. [Please see my note below.] : Please see my note below. [Consult Closing:] : Thank you very much for allowing me to participate in the care of this patient.  If you have any questions, please do not hesitate to contact me. [FreeTextEntry3] : Sincerely,\par \par Pedrito Barboza MD, Colon and Rectal Surgery\par \par [employer] at [facility]\par [address] [place] [address]\par [facility], 3rd Floor\par [place], [place] [address]\par Tel [phone] ext 2\par Fax [phone]\par

## 2021-01-13 NOTE — ASSESSMENT
[FreeTextEntry1] : 60F s/p incision and drainage of ischiorectal abscess\par \par Patient has recovered well.  We discussed the possibility of fistula formation.  We will see her back as needed.

## 2021-01-13 NOTE — HISTORY OF PRESENT ILLNESS
[FreeTextEntry1] : Patient is a 60F with PMH HTN, hypothyroid and previous MRSA infections who presents for follow up of I and D of ischiorectal abscess. She was evaluated by Dr. Parsons for anal pain who sent the patient for MRI.  MRI showed a 3cm perianal abscess.  Patient presented to [facility] for perianal abscess drainage however she was unable to tolerate bedside drainage. She presented to our office on 9/28 and underwent incision and drainage in the office.  Cultures showed multiple organisms. She presents today for follow up.  Patient states she is back to her baseline and no longer has pain or drainage. Patient denies fevers, chills, nausea, vomiting, abdominal pain, constipation, diarrhea, blood in his stool or unexpected weight loss.  Patient denies a family history of colon cancer rectal cancer or inflammatory bowel disease.  Patients last colonoscopy was in 2019 by Dr. Parsons and was normal.  We do not have those results.\par

## 2021-10-03 ENCOUNTER — TRANSCRIPTION ENCOUNTER (OUTPATIENT)
Age: 61
End: 2021-10-03

## 2022-05-16 ENCOUNTER — NON-APPOINTMENT (OUTPATIENT)
Age: 62
End: 2022-05-16

## 2022-06-05 ENCOUNTER — NON-APPOINTMENT (OUTPATIENT)
Age: 62
End: 2022-06-05

## 2022-12-07 ENCOUNTER — INPATIENT (INPATIENT)
Facility: HOSPITAL | Age: 62
LOS: 1 days | Discharge: HOME | End: 2022-12-09
Attending: STUDENT IN AN ORGANIZED HEALTH CARE EDUCATION/TRAINING PROGRAM | Admitting: STUDENT IN AN ORGANIZED HEALTH CARE EDUCATION/TRAINING PROGRAM

## 2022-12-07 VITALS
TEMPERATURE: 100 F | RESPIRATION RATE: 18 BRPM | DIASTOLIC BLOOD PRESSURE: 88 MMHG | OXYGEN SATURATION: 99 % | SYSTOLIC BLOOD PRESSURE: 199 MMHG | HEART RATE: 91 BPM

## 2022-12-07 LAB
ALBUMIN SERPL ELPH-MCNC: 3.4 G/DL — LOW (ref 3.5–5.2)
ALP SERPL-CCNC: 145 U/L — HIGH (ref 30–115)
ALT FLD-CCNC: 11 U/L — SIGNIFICANT CHANGE UP (ref 0–41)
ANION GAP SERPL CALC-SCNC: 12 MMOL/L — SIGNIFICANT CHANGE UP (ref 7–14)
AST SERPL-CCNC: 23 U/L — SIGNIFICANT CHANGE UP (ref 0–41)
BASOPHILS # BLD AUTO: 0.01 K/UL — SIGNIFICANT CHANGE UP (ref 0–0.2)
BASOPHILS NFR BLD AUTO: 0.2 % — SIGNIFICANT CHANGE UP (ref 0–1)
BILIRUB SERPL-MCNC: 1.3 MG/DL — HIGH (ref 0.2–1.2)
BUN SERPL-MCNC: 23 MG/DL — HIGH (ref 10–20)
CALCIUM SERPL-MCNC: 7.9 MG/DL — LOW (ref 8.4–10.5)
CHLORIDE SERPL-SCNC: 98 MMOL/L — SIGNIFICANT CHANGE UP (ref 98–110)
CO2 SERPL-SCNC: 22 MMOL/L — SIGNIFICANT CHANGE UP (ref 17–32)
CREAT SERPL-MCNC: 1.6 MG/DL — HIGH (ref 0.7–1.5)
EGFR: 36 ML/MIN/1.73M2 — LOW
EOSINOPHIL # BLD AUTO: 0.05 K/UL — SIGNIFICANT CHANGE UP (ref 0–0.7)
EOSINOPHIL NFR BLD AUTO: 1.2 % — SIGNIFICANT CHANGE UP (ref 0–8)
FLUAV AG NPH QL: SIGNIFICANT CHANGE UP
FLUBV AG NPH QL: SIGNIFICANT CHANGE UP
GLUCOSE SERPL-MCNC: 134 MG/DL — HIGH (ref 70–99)
HCT VFR BLD CALC: 29 % — LOW (ref 37–47)
HGB BLD-MCNC: 9.2 G/DL — LOW (ref 12–16)
IMM GRANULOCYTES NFR BLD AUTO: 0.7 % — HIGH (ref 0.1–0.3)
LACTATE SERPL-SCNC: 1.8 MMOL/L — SIGNIFICANT CHANGE UP (ref 0.7–2)
LYMPHOCYTES # BLD AUTO: 0.52 K/UL — LOW (ref 1.2–3.4)
LYMPHOCYTES # BLD AUTO: 12.8 % — LOW (ref 20.5–51.1)
MCHC RBC-ENTMCNC: 26.9 PG — LOW (ref 27–31)
MCHC RBC-ENTMCNC: 31.7 G/DL — LOW (ref 32–37)
MCV RBC AUTO: 84.8 FL — SIGNIFICANT CHANGE UP (ref 81–99)
MONOCYTES # BLD AUTO: 0.36 K/UL — SIGNIFICANT CHANGE UP (ref 0.1–0.6)
MONOCYTES NFR BLD AUTO: 8.9 % — SIGNIFICANT CHANGE UP (ref 1.7–9.3)
NEUTROPHILS # BLD AUTO: 3.08 K/UL — SIGNIFICANT CHANGE UP (ref 1.4–6.5)
NEUTROPHILS NFR BLD AUTO: 76.2 % — HIGH (ref 42.2–75.2)
NRBC # BLD: 0 /100 WBCS — SIGNIFICANT CHANGE UP (ref 0–0)
PLATELET # BLD AUTO: 73 K/UL — LOW (ref 130–400)
POTASSIUM SERPL-MCNC: 3.7 MMOL/L — SIGNIFICANT CHANGE UP (ref 3.5–5)
POTASSIUM SERPL-SCNC: 3.7 MMOL/L — SIGNIFICANT CHANGE UP (ref 3.5–5)
PROT SERPL-MCNC: 5.6 G/DL — LOW (ref 6–8)
RBC # BLD: 3.42 M/UL — LOW (ref 4.2–5.4)
RBC # FLD: 16.5 % — HIGH (ref 11.5–14.5)
RSV RNA NPH QL NAA+NON-PROBE: SIGNIFICANT CHANGE UP
SARS-COV-2 RNA SPEC QL NAA+PROBE: SIGNIFICANT CHANGE UP
SODIUM SERPL-SCNC: 132 MMOL/L — LOW (ref 135–146)
WBC # BLD: 4.05 K/UL — LOW (ref 4.8–10.8)
WBC # FLD AUTO: 4.05 K/UL — LOW (ref 4.8–10.8)

## 2022-12-07 PROCEDURE — 99285 EMERGENCY DEPT VISIT HI MDM: CPT

## 2022-12-07 PROCEDURE — 71045 X-RAY EXAM CHEST 1 VIEW: CPT | Mod: 26

## 2022-12-07 RX ORDER — LEVETIRACETAM 250 MG/1
750 TABLET, FILM COATED ORAL ONCE
Refills: 0 | Status: COMPLETED | OUTPATIENT
Start: 2022-12-07 | End: 2022-12-07

## 2022-12-07 RX ORDER — ACETAMINOPHEN 500 MG
975 TABLET ORAL ONCE
Refills: 0 | Status: COMPLETED | OUTPATIENT
Start: 2022-12-07 | End: 2022-12-07

## 2022-12-07 RX ORDER — SODIUM CHLORIDE 9 MG/ML
1000 INJECTION INTRAMUSCULAR; INTRAVENOUS; SUBCUTANEOUS ONCE
Refills: 0 | Status: COMPLETED | OUTPATIENT
Start: 2022-12-07 | End: 2022-12-07

## 2022-12-07 RX ADMIN — SODIUM CHLORIDE 1000 MILLILITER(S): 9 INJECTION INTRAMUSCULAR; INTRAVENOUS; SUBCUTANEOUS at 19:16

## 2022-12-07 RX ADMIN — Medication 975 MILLIGRAM(S): at 19:17

## 2022-12-07 RX ADMIN — LEVETIRACETAM 750 MILLIGRAM(S): 250 TABLET, FILM COATED ORAL at 22:48

## 2022-12-07 NOTE — ED PROVIDER NOTE - PROGRESS NOTE DETAILS
Dr. Jeffries: sign out to Dr. Nieves  Pt pending labs, UA NC: Spoke to pt heme/onc aware, pt can be admitted to [facility] medicine service. heme/onc dr patino will call tomorrow

## 2022-12-07 NOTE — ED PROVIDER NOTE - NS ED ATTENDING STATEMENT MOD
This was a shared visit with the CLARK. I reviewed and verified the documentation and independently performed the documented:

## 2022-12-07 NOTE — ED PROVIDER NOTE - ATTENDING APP SHARED VISIT CONTRIBUTION OF CARE
62-year-old female with past medical history of lung cancer, seizures, hyperlipidemia presents to ED for fever and rash.  Patient states her fever is about 101 at home.  She also notices diffuse painful pruritic rash. No rash in her mouth. Patient states she recently started Lamictal.  No nausea vomiting diarrhea constipation.  No cough or shortness of breath.    Const: NAD  Eyes: PERRL, no conjunctival injection  HENT:  Neck supple without meningismus   CV: RRR, Warm, well-perfused extremities  RESP: CTA B/L, no tachypnea   GI: soft, non-tender, non-distended  MSK: No gross deformities appreciated  Skin: diffuse rash  Neuro: Alert, CNs II-XII grossly intact. Sensation and motor function of extremities grossly intact.  Psych: Appropriate mood and affect.    labs, cultures, cxr, ua, call heme/onc

## 2022-12-07 NOTE — ED ADULT TRIAGE NOTE - CHIEF COMPLAINT QUOTE
as per pt. rash to the face and chest. pt. started Lamictal 2 weeks ago. hx of gamma knife radiation for brain tumor. uvula midline no swelling noted. denies any SOB. pt. on immuno therapy

## 2022-12-07 NOTE — ED PROVIDER NOTE - OBJECTIVE STATEMENT
63 yo F pmhx Lung cancer with mets, seizures on keppra and recently started on lamictal 25mg, HLD presenting to the ED for evaluation of fever and rash x 2 days. pt reports she developed fever of 101F last night, also developed rash to face, chest and back. pt reports she was seen yesterday by her oncologist Dr Escobar in [place] had labs done at that time, fever and rash began after her visit with heme/onc. pt last dose of lamictal was this am. denies chest pain, sob, nausea, vomiting, abd pain, dysuria, hematuria, cough.

## 2022-12-07 NOTE — ED PROVIDER NOTE - PHYSICAL EXAMINATION
GENERAL: Well-nourished, Well-developed. NAD.  HEAD: No visible or palpable bumps or hematomas. No ecchymosis behind ears B/L.  ENMT: MMM.   Neck: Supple. FROM  CVS: RRR. Normal S1,S2. No murmurs appreciated on auscultation   RESP: No use of accessory muscles. Chest rise symmetrical with good expansion. Lungs clear to auscultation B/L. No wheezing, rales, or rhonchi auscultated.  GI: Normal auscultation of bowel sounds in all 4 quadrants. Soft, Nontender, Nondistended. No guarding or rebound tenderness. No CVAT B/L.  Skin: (+)erythematous rash to face, chest and back, blanching. no mucous membrane involvement  EXT: Radial and pedal pulses present B/L. No calf tenderness or swelling B/L. No palpable cords. No pedal edema B/L.  Neuro: AA&O x 3. Sensation grossly intact. Strength 5/5 B/L.

## 2022-12-07 NOTE — ED PROVIDER NOTE - NS ED ROS FT
Constitutional: (+) fever, chills.  Eyes:  No visual changes  ENMT:  No neck pain  Cardiac:  No chest pain  Respiratory:  No cough, SOB  GI:  No nausea, vomiting, diarrhea, abdominal pain.  :  No dysuria, hematuria  MS:  No back pain.  Neuro:  No headache or lightheadedness  Skin:  (+) skin rash  Endocrine: No history of thyroid disease or diabetes.  Except as documented in the HPI,  all other systems are negative.

## 2022-12-07 NOTE — ED PROVIDER NOTE - CARE PLAN
1 Principal Discharge DX:	Fever  Secondary Diagnosis:	Rash   Principal Discharge DX:	Fever  Secondary Diagnosis:	Rash  Secondary Diagnosis:	Acute UTI

## 2022-12-08 LAB
APPEARANCE UR: ABNORMAL
BACTERIA # UR AUTO: ABNORMAL
BILIRUB UR-MCNC: NEGATIVE — SIGNIFICANT CHANGE UP
COLOR SPEC: YELLOW — SIGNIFICANT CHANGE UP
DIFF PNL FLD: ABNORMAL
EPI CELLS # UR: 14 /HPF — HIGH (ref 0–5)
GLUCOSE BLDC GLUCOMTR-MCNC: 116 MG/DL — HIGH (ref 70–99)
GLUCOSE BLDC GLUCOMTR-MCNC: 190 MG/DL — HIGH (ref 70–99)
GLUCOSE UR QL: NEGATIVE — SIGNIFICANT CHANGE UP
GRAN CASTS # UR COMP ASSIST: 2 /LPF — HIGH
HYALINE CASTS # UR AUTO: 6 /LPF — SIGNIFICANT CHANGE UP (ref 0–7)
KETONES UR-MCNC: NEGATIVE — SIGNIFICANT CHANGE UP
LEUKOCYTE ESTERASE UR-ACNC: ABNORMAL
NITRITE UR-MCNC: NEGATIVE — SIGNIFICANT CHANGE UP
PH UR: 6 — SIGNIFICANT CHANGE UP (ref 5–8)
PROT UR-MCNC: ABNORMAL
RBC CASTS # UR COMP ASSIST: 26 /HPF — HIGH (ref 0–4)
SP GR SPEC: 1.02 — SIGNIFICANT CHANGE UP (ref 1.01–1.03)
UROBILINOGEN FLD QL: ABNORMAL
WBC UR QL: 13 /HPF — HIGH (ref 0–5)

## 2022-12-08 PROCEDURE — 99223 1ST HOSP IP/OBS HIGH 75: CPT

## 2022-12-08 PROCEDURE — 93970 EXTREMITY STUDY: CPT | Mod: 26

## 2022-12-08 RX ORDER — FUROSEMIDE 40 MG
1 TABLET ORAL
Qty: 0 | Refills: 0 | DISCHARGE

## 2022-12-08 RX ORDER — ATORVASTATIN CALCIUM 80 MG/1
80 TABLET, FILM COATED ORAL AT BEDTIME
Refills: 0 | Status: DISCONTINUED | OUTPATIENT
Start: 2022-12-08 | End: 2022-12-09

## 2022-12-08 RX ORDER — PEMETREXED 500 MG/20ML
0 INJECTION, SOLUTION, CONCENTRATE INTRAVENOUS
Qty: 0 | Refills: 0 | DISCHARGE

## 2022-12-08 RX ORDER — FAMOTIDINE 10 MG/ML
1 INJECTION INTRAVENOUS
Qty: 0 | Refills: 0 | DISCHARGE

## 2022-12-08 RX ORDER — SENNA PLUS 8.6 MG/1
2 TABLET ORAL AT BEDTIME
Refills: 0 | Status: DISCONTINUED | OUTPATIENT
Start: 2022-12-08 | End: 2022-12-09

## 2022-12-08 RX ORDER — METOPROLOL TARTRATE 50 MG
25 TABLET ORAL DAILY
Refills: 0 | Status: DISCONTINUED | OUTPATIENT
Start: 2022-12-08 | End: 2022-12-09

## 2022-12-08 RX ORDER — FOLIC ACID 0.8 MG
1 TABLET ORAL
Qty: 0 | Refills: 0 | DISCHARGE

## 2022-12-08 RX ORDER — LEVETIRACETAM 250 MG/1
750 TABLET, FILM COATED ORAL
Refills: 0 | Status: DISCONTINUED | OUTPATIENT
Start: 2022-12-08 | End: 2022-12-08

## 2022-12-08 RX ORDER — PROCHLORPERAZINE MALEATE 5 MG
10 TABLET ORAL THREE TIMES A DAY
Refills: 0 | Status: DISCONTINUED | OUTPATIENT
Start: 2022-12-08 | End: 2022-12-09

## 2022-12-08 RX ORDER — LEVETIRACETAM 250 MG/1
1000 TABLET, FILM COATED ORAL
Refills: 0 | Status: DISCONTINUED | OUTPATIENT
Start: 2022-12-08 | End: 2022-12-09

## 2022-12-08 RX ORDER — FOLIC ACID 0.8 MG
1 TABLET ORAL DAILY
Refills: 0 | Status: DISCONTINUED | OUTPATIENT
Start: 2022-12-08 | End: 2022-12-09

## 2022-12-08 RX ORDER — METOPROLOL TARTRATE 50 MG
1 TABLET ORAL
Qty: 0 | Refills: 0 | DISCHARGE

## 2022-12-08 RX ORDER — DIPHENHYDRAMINE HCL 50 MG
25 CAPSULE ORAL EVERY 4 HOURS
Refills: 0 | Status: DISCONTINUED | OUTPATIENT
Start: 2022-12-08 | End: 2022-12-09

## 2022-12-08 RX ORDER — ALPRAZOLAM 0.25 MG
1 TABLET ORAL
Qty: 0 | Refills: 0 | DISCHARGE

## 2022-12-08 RX ORDER — MIDAZOLAM HYDROCHLORIDE 1 MG/ML
1 INJECTION, SOLUTION INTRAMUSCULAR; INTRAVENOUS
Qty: 0 | Refills: 0 | DISCHARGE

## 2022-12-08 RX ORDER — LANOLIN ALCOHOL/MO/W.PET/CERES
5 CREAM (GRAM) TOPICAL AT BEDTIME
Refills: 0 | Status: DISCONTINUED | OUTPATIENT
Start: 2022-12-08 | End: 2022-12-09

## 2022-12-08 RX ORDER — LEVETIRACETAM 250 MG/1
750 TABLET, FILM COATED ORAL ONCE
Refills: 0 | Status: COMPLETED | OUTPATIENT
Start: 2022-12-08 | End: 2022-12-08

## 2022-12-08 RX ORDER — POLYETHYLENE GLYCOL 3350 17 G/17G
17 POWDER, FOR SOLUTION ORAL DAILY
Refills: 0 | Status: DISCONTINUED | OUTPATIENT
Start: 2022-12-08 | End: 2022-12-09

## 2022-12-08 RX ORDER — ALPRAZOLAM 0.25 MG
0.25 TABLET ORAL AT BEDTIME
Refills: 0 | Status: DISCONTINUED | OUTPATIENT
Start: 2022-12-08 | End: 2022-12-09

## 2022-12-08 RX ORDER — FUROSEMIDE 40 MG
20 TABLET ORAL DAILY
Refills: 0 | Status: DISCONTINUED | OUTPATIENT
Start: 2022-12-08 | End: 2022-12-09

## 2022-12-08 RX ORDER — ASPIRIN/CALCIUM CARB/MAGNESIUM 324 MG
1 TABLET ORAL
Qty: 0 | Refills: 0 | DISCHARGE

## 2022-12-08 RX ORDER — LEVOTHYROXINE SODIUM 125 MCG
1 TABLET ORAL
Qty: 0 | Refills: 0 | DISCHARGE

## 2022-12-08 RX ORDER — LEVOTHYROXINE SODIUM 125 MCG
125 TABLET ORAL DAILY
Refills: 0 | Status: DISCONTINUED | OUTPATIENT
Start: 2022-12-08 | End: 2022-12-09

## 2022-12-08 RX ORDER — SENNA PLUS 8.6 MG/1
1 TABLET ORAL
Qty: 0 | Refills: 0 | DISCHARGE

## 2022-12-08 RX ORDER — CHLORHEXIDINE GLUCONATE 213 G/1000ML
1 SOLUTION TOPICAL
Refills: 0 | Status: DISCONTINUED | OUTPATIENT
Start: 2022-12-08 | End: 2022-12-09

## 2022-12-08 RX ORDER — FAMOTIDINE 10 MG/ML
20 INJECTION INTRAVENOUS DAILY
Refills: 0 | Status: DISCONTINUED | OUTPATIENT
Start: 2022-12-08 | End: 2022-12-09

## 2022-12-08 RX ORDER — ATORVASTATIN CALCIUM 80 MG/1
1 TABLET, FILM COATED ORAL
Qty: 0 | Refills: 0 | DISCHARGE

## 2022-12-08 RX ORDER — ASPIRIN/CALCIUM CARB/MAGNESIUM 324 MG
81 TABLET ORAL DAILY
Refills: 0 | Status: DISCONTINUED | OUTPATIENT
Start: 2022-12-08 | End: 2022-12-09

## 2022-12-08 RX ORDER — ACETAMINOPHEN 500 MG
650 TABLET ORAL EVERY 6 HOURS
Refills: 0 | Status: DISCONTINUED | OUTPATIENT
Start: 2022-12-08 | End: 2022-12-09

## 2022-12-08 RX ORDER — DIPHENHYDRAMINE HCL 50 MG
25 CAPSULE ORAL ONCE
Refills: 0 | Status: COMPLETED | OUTPATIENT
Start: 2022-12-08 | End: 2022-12-08

## 2022-12-08 RX ORDER — PEMBROLIZUMAB 25 MG/ML
0 INJECTION, SOLUTION INTRAVENOUS
Qty: 0 | Refills: 0 | DISCHARGE

## 2022-12-08 RX ORDER — CEFTRIAXONE 500 MG/1
1000 INJECTION, POWDER, FOR SOLUTION INTRAMUSCULAR; INTRAVENOUS ONCE
Refills: 0 | Status: COMPLETED | OUTPATIENT
Start: 2022-12-08 | End: 2022-12-08

## 2022-12-08 RX ORDER — HEPARIN SODIUM 5000 [USP'U]/ML
5000 INJECTION INTRAVENOUS; SUBCUTANEOUS EVERY 12 HOURS
Refills: 0 | Status: DISCONTINUED | OUTPATIENT
Start: 2022-12-08 | End: 2022-12-09

## 2022-12-08 RX ADMIN — Medication 10 MILLIGRAM(S): at 23:56

## 2022-12-08 RX ADMIN — ATORVASTATIN CALCIUM 80 MILLIGRAM(S): 80 TABLET, FILM COATED ORAL at 21:34

## 2022-12-08 RX ADMIN — Medication 81 MILLIGRAM(S): at 11:45

## 2022-12-08 RX ADMIN — CEFTRIAXONE 100 MILLIGRAM(S): 500 INJECTION, POWDER, FOR SOLUTION INTRAMUSCULAR; INTRAVENOUS at 03:30

## 2022-12-08 RX ADMIN — POLYETHYLENE GLYCOL 3350 17 GRAM(S): 17 POWDER, FOR SOLUTION ORAL at 23:56

## 2022-12-08 RX ADMIN — Medication 650 MILLIGRAM(S): at 03:29

## 2022-12-08 RX ADMIN — Medication 20 MILLIGRAM(S): at 09:47

## 2022-12-08 RX ADMIN — Medication 0.25 MILLIGRAM(S): at 23:55

## 2022-12-08 RX ADMIN — Medication 650 MILLIGRAM(S): at 11:46

## 2022-12-08 RX ADMIN — FAMOTIDINE 20 MILLIGRAM(S): 10 INJECTION INTRAVENOUS at 11:45

## 2022-12-08 RX ADMIN — Medication 650 MILLIGRAM(S): at 20:56

## 2022-12-08 RX ADMIN — HEPARIN SODIUM 5000 UNIT(S): 5000 INJECTION INTRAVENOUS; SUBCUTANEOUS at 18:16

## 2022-12-08 RX ADMIN — Medication 25 MILLIGRAM(S): at 11:45

## 2022-12-08 RX ADMIN — Medication 1 MILLIGRAM(S): at 11:45

## 2022-12-08 RX ADMIN — Medication 25 MILLIGRAM(S): at 21:34

## 2022-12-08 RX ADMIN — LEVETIRACETAM 750 MILLIGRAM(S): 250 TABLET, FILM COATED ORAL at 10:24

## 2022-12-08 RX ADMIN — LEVETIRACETAM 1000 MILLIGRAM(S): 250 TABLET, FILM COATED ORAL at 21:35

## 2022-12-08 RX ADMIN — Medication 650 MILLIGRAM(S): at 21:39

## 2022-12-08 NOTE — CONSULT NOTE ADULT - ASSESSMENT
Patient is a 61yo F w/ PMH of HTN, HLD, CAD s/p PCI x1 (Nov 2021), seizures on Keppra and Lamictal, Metastatic Lung Cancer to liver and Brain s/p gamma knife radiation (followed at [facility]), presents for fever and rash that began yesterday. Neurology was consulted for potential allergic drug reaction to Lamictal and to provide AED recommendations. Followed by neurologist Dr. Lorena Madrid at [facility] ([phone]).    #Rash  -possibly 2/2 Lamictal, last dose was 50mg on 12/7  -patient and  believe the rash is improving slightly since yesterday  ....... Patient is a 63yo F w/ PMH of HTN, HLD, CAD s/p PCI x1 (Nov 2021), seizures on Keppra and Lamictal, Metastatic Lung Cancer to liver and Brain s/p gamma knife radiation (followed at [facility]), presents for fever and rash that began yesterday. Neurology was consulted for potential allergic drug reaction to Lamictal and to provide AED recommendations. Followed by neurologist Dr. Lorena Madrid at [facility] ([phone]).    #Rash  -possibly 2/2 Lamictal, last dose was 50mg on 12/7  -patient and  believe the rash is improving slightly since yesterday  -c/w keppra 750mg BID  -c/w holding Lamictal at this time  -contacted patients Neurologist office at [facility] for additional history, gave call back number as provider was out of office today  -Infectious Diseases consulted/following, does not believe there is an infectious etiology at this time  .......    #fever  -resolved Patient is a 61yo F w/ PMH of HTN, HLD, CAD s/p PCI x1 (Nov 2021), seizures on Keppra and Lamictal, Metastatic Lung Cancer to liver and Brain s/p gamma knife radiation (followed at [facility]), presents for fever and rash that began yesterday. Neurology was consulted for potential allergic drug reaction to Lamictal and to provide AED recommendations. Followed by neurologist Dr. Lorena Madrid at [facility] ([phone]).    #Rash  -possibly 2/2 Lamictal, last dose was 50mg on 12/7  -patient and  believe the rash is improving slightly since yesterday  -increase keppra to 1000mg BID  -start vitamin B6 100mg daily for AED cognitive symptoms  -c/w holding Lamictal at this time  -contacted patients Neurologist office at [facility] for additional history, gave call back number as provider was out of office today  -Infectious Diseases consulted/following, does not believe there is an infectious etiology at this time    #fever  -resolved

## 2022-12-08 NOTE — CONSULT NOTE ADULT - TIME-BASED
Tommy Tuttle, PGY-1  Internal Medicine  Pager:  [phone]    Patient is a 68y old  Female who presents with a chief complaint of Sepsis (27 Feb 2019 13:35)      SUBJECTIVE / OVERNIGHT EVENTS:   Patient seen and examined at bedside. Patient hypertensive overnight. Remains seizing.    MEDICATIONS  (STANDING):  amLODIPine   Tablet 10 milliGRAM(s) Oral daily  docusate sodium 100 milliGRAM(s) Oral two times a day  enoxaparin Injectable 40 milliGRAM(s) SubCutaneous every 24 hours  influenza   Vaccine 0.5 milliLiter(s) IntraMuscular once  lactated ringers. 1000 milliLiter(s) (75 mL/Hr) IV Continuous <Continuous>  lactobacillus acidophilus 1 Tablet(s) Oral two times a day with meals  levETIRAcetam  IVPB 1000 milliGRAM(s) IV Intermittent every 12 hours  linezolid  IVPB 600 milliGRAM(s) IV Intermittent every 12 hours  LORazepam   Injectable 2 milliGRAM(s) IV Push every 4 hours  senna 2 Tablet(s) Oral at bedtime    MEDICATIONS  (PRN):  acetaminophen   Tablet .. 650 milliGRAM(s) Oral every 6 hours PRN Mild Pain (1 - 3), Moderate Pain (4 - 6)  LORazepam   Injectable 2 milliGRAM(s) IV Push every 1 hour PRN seizure  polyethylene glycol 3350 17 Gram(s) Oral daily PRN Constipation      CAPILLARY BLOOD GLUCOSE          I&O's Summary    27 Feb 2019 07:01  -  28 Feb 2019 07:00  --------------------------------------------------------  IN: 400 mL / OUT: 0 mL / NET: 400 mL        T(C): 36.7 (02-28-19 @ 06:00), Max: 36.8 (02-27-19 @ 21:26)  HR: 97 (02-28-19 @ 06:00) (95 - 100)  BP: 160/91 (02-28-19 @ 06:00) (152/98 - 164/94)  RR: 18 (02-28-19 @ 06:00) (18 - 20)  SpO2: 100% (02-28-19 @ 06:00) (95% - 100%)    PHYSICAL EXAM:  GENERAL: NAD, well-developed  HEAD:  Atraumatic, Normocephalic  EYES: PERRL. EOMI,  conjunctiva and sclera clear.  NECK: Supple  CHEST/LUNG: Clear to auscultation bilaterally. Large R sided chest wound, non-erythematous, non-purulent with dressing in place  HEART: Regular rate and rhythm. Normal sounding S1, S2. No murmurs, rubs, or gallops  ABDOMEN: Soft, nontender, nondistended; Bowel sounds present.  EXTREMITIES:  No clubbing, cyanosis, or edema. Peripheral pulses 2+ and symmetric. Right thigh graft site dry, non-erythematous  PSYCH: Only responding to pain  NEUROLOGY: left sided focal seizures  SKIN: No rashes or lesions    LABS:    WBC Trend: 12.30<--, 10.45<--, 11.11<--        Creatinine Trend: 1.02<--, 0.25<--, 0.36<--, 0.26<--, 0.27<--, 0.35<--            RADIOLOGY & ADDITIONAL TESTS:    Imaging Personally Reviewed:    Consultant(s) Notes Reviewed:  ID, Palliative    Care Discussed with Consultants/Other Providers: Palliative 35 Tommy Tuttle, PGY-1  Internal Medicine  Pager:  [phone]    Patient is a 68y old  Female who presents with a chief complaint of Sepsis (27 Feb 2019 13:35)      SUBJECTIVE / OVERNIGHT EVENTS:   Patient seen and examined at bedside. Patient hypertensive overnight. Remains seizing.    MEDICATIONS  (STANDING):  amLODIPine   Tablet 10 milliGRAM(s) Oral daily  docusate sodium 100 milliGRAM(s) Oral two times a day  enoxaparin Injectable 40 milliGRAM(s) SubCutaneous every 24 hours  influenza   Vaccine 0.5 milliLiter(s) IntraMuscular once  lactated ringers. 1000 milliLiter(s) (75 mL/Hr) IV Continuous <Continuous>  lactobacillus acidophilus 1 Tablet(s) Oral two times a day with meals  levETIRAcetam  IVPB 1000 milliGRAM(s) IV Intermittent every 12 hours  linezolid  IVPB 600 milliGRAM(s) IV Intermittent every 12 hours  LORazepam   Injectable 2 milliGRAM(s) IV Push every 4 hours  senna 2 Tablet(s) Oral at bedtime    MEDICATIONS  (PRN):  acetaminophen   Tablet .. 650 milliGRAM(s) Oral every 6 hours PRN Mild Pain (1 - 3), Moderate Pain (4 - 6)  LORazepam   Injectable 2 milliGRAM(s) IV Push every 1 hour PRN seizure  polyethylene glycol 3350 17 Gram(s) Oral daily PRN Constipation      CAPILLARY BLOOD GLUCOSE          I&O's Summary    27 Feb 2019 07:01  -  28 Feb 2019 07:00  --------------------------------------------------------  IN: 400 mL / OUT: 0 mL / NET: 400 mL        T(C): 36.7 (02-28-19 @ 06:00), Max: 36.8 (02-27-19 @ 21:26)  HR: 97 (02-28-19 @ 06:00) (95 - 100)  BP: 160/91 (02-28-19 @ 06:00) (152/98 - 164/94)  RR: 18 (02-28-19 @ 06:00) (18 - 20)  SpO2: 100% (02-28-19 @ 06:00) (95% - 100%)    PHYSICAL EXAM:  GENERAL: NAD, well-developed  HEAD:  Atraumatic, Normocephalic  EYES: PERRL. EOMI,  conjunctiva and sclera clear.  NECK: Supple  CHEST/LUNG: Clear to auscultation bilaterally. Large R sided chest wound, bleeding through dressing  HEART: Regular rate and rhythm. Normal sounding S1, S2. No murmurs, rubs, or gallops  ABDOMEN: Soft, nontender, nondistended; Bowel sounds present.  EXTREMITIES:  No clubbing, cyanosis, or edema. Peripheral pulses 2+ and symmetric. Right thigh graft site dry, non-erythematous  PSYCH: Only responding to pain  NEUROLOGY: left sided focal seizures  SKIN: No rashes or lesions    LABS:    WBC Trend: 12.30<--, 10.45<--, 11.11<--        Creatinine Trend: 1.02<--, 0.25<--, 0.36<--, 0.26<--, 0.27<--, 0.35<--            RADIOLOGY & ADDITIONAL TESTS:    Imaging Personally Reviewed:    Consultant(s) Notes Reviewed:  ID, Palliative    Care Discussed with Consultants/Other Providers: Palliative

## 2022-12-08 NOTE — H&P ADULT - NSHPPHYSICALEXAM_GEN_ALL_CORE
Vital Signs Last 24 Hrs  T(C): 36.9 (08 Dec 2022 00:14), Max: 37.7 (07 Dec 2022 16:36)  T(F): 98.5 (08 Dec 2022 00:14), Max: 99.8 (07 Dec 2022 16:36)  HR: 72 (08 Dec 2022 00:14) (72 - 91)  BP: 131/61 (08 Dec 2022 00:14) (131/61 - 199/88)  RR: 18 (07 Dec 2022 16:36) (18 - 18)  SpO2: 98% (08 Dec 2022 00:14) (98% - 99%)    Parameters below as of 08 Dec 2022 00:14  Patient On (Oxygen Delivery Method): room air    PHYSICAL EXAM  GENERAL: NAD, well-groomed, well-developed  HEAD:  NCAT  EYES: EOMI, PERRL, conjunctiva clear  ENMT: No tonsillar erythema, exudates, or enlargement; Moist mucous membranes, Good dentition, No lesions  NECK: Supple, No JVD, Normal thyroid  NERVOUS SYSTEM: AAOX4, Good concentration; Motor Strength 5/5 B/L upper and lower extremities; DTRs 2+ intact and symmetric  CHEST/LUNG: CTA b/l no w/r/r  HEART: +s1s2 RRR no m/g/r  ABDOMEN: soft, NT/ND (+) bs, no HSM  EXTREMITIES:  2+ Peripheral Pulses, No c/c/e  LYMPH: No lymphadenopathy noted  SKIN: No rashes or lesions Vital Signs Last 24 Hrs  T(C): 36.9 (08 Dec 2022 00:14), Max: 37.7 (07 Dec 2022 16:36)  T(F): 98.5 (08 Dec 2022 00:14), Max: 99.8 (07 Dec 2022 16:36)  HR: 72 (08 Dec 2022 00:14) (72 - 91)  BP: 131/61 (08 Dec 2022 00:14) (131/61 - 199/88)  RR: 18 (07 Dec 2022 16:36) (18 - 18)  SpO2: 98% (08 Dec 2022 00:14) (98% - 99%)    Parameters below as of 08 Dec 2022 00:14  Patient On (Oxygen Delivery Method): room air    PHYSICAL EXAM  GENERAL: NAD  HEAD:  NCAT, EOMI, PERRL, MMM, +facial rash  NECK: Supple, Nontender, thick  NERVOUS SYSTEM: AAOx3, NFD  CHEST/LUNG: CTA b/l, + rash  HEART: +s1s2 RRR,   ABDOMEN: soft, obeses, NT/ND, +rash  EXTREMITIES: pp, no edema  SKIN: diffuse hypersensity-appearing rash Vital Signs Last 24 Hrs  T(C): 36.9 (08 Dec 2022 00:14), Max: 37.7 (07 Dec 2022 16:36)  T(F): 98.5 (08 Dec 2022 00:14), Max: 99.8 (07 Dec 2022 16:36)  HR: 72 (08 Dec 2022 00:14) (72 - 91)  BP: 131/61 (08 Dec 2022 00:14) (131/61 - 199/88)  RR: 18 (07 Dec 2022 16:36) (18 - 18)  SpO2: 98% (08 Dec 2022 00:14) (98% - 99%)    Parameters below as of 08 Dec 2022 00:14  Patient On (Oxygen Delivery Method): room air    PHYSICAL EXAM  GENERAL: NAD  HEAD:  NCAT, EOMI, PERRL, MMM, +facial rash  NECK: Supple, Nontender, thick  NERVOUS SYSTEM: AAOx3, NFD  CHEST/LUNG: CTA b/l, + rash  HEART: +s1s2 RRR,   ABDOMEN: soft, obeses, NT/ND, +rash  EXTREMITIES: pp, no edema  SKIN: diffuse upper body rash

## 2022-12-08 NOTE — H&P ADULT - NSHPLABSRESULTS_GEN_ALL_CORE
Labs:                        9.2    4.05  )-----------( 73       ( 07 Dec 2022 20:10 )             29.0     132<L>  |  98  |  23<H>  ----------------------------<  134<H>  3.7   |  22  |  1.6<H>    Ca    7.9<L>      07 Dec 2022 20:10    TPro  5.6<L>  /  Alb  3.4<L>  /  TBili  1.3<H>  /  DBili  x   /  AST  23  /  ALT  11  /  AlkPhos  145<H>  12-    Lactate, Blood: 1.8 mmol/L (22 @ 20:10)    Urinalysis Basic - ( 08 Dec 2022 00:32 )  Color: Yellow / Appearance: Slightly Turbid / S.016 / pH: x  Gluc: x / Ketone: Negative  / Bili: Negative / Urobili: 3 mg/dL   Blood: x / Protein: 100 mg/dL / Nitrite: Negative   Leuk Esterase: Moderate / RBC: 26 /HPF / WBC 13 /HPF   Sq Epi: x / Non Sq Epi: 14 /HPF / Bacteria: Few

## 2022-12-08 NOTE — CONSULT NOTE ADULT - SUBJECTIVE AND OBJECTIVE BOX
Neurology Consult    Patient is a 62y old  Female who presents with a chief complaint of allergic reaction (08 Dec 2022 08:49)    Consult Hx:   Patient is a 63yo F w/ PMH of HTN, HLD, CAD s/p PCI x1 (2021), seizures on Keppra and Lamictal, Metastatic Lung Cancer to liver and Brain s/p gamma knife radiation (followed at [facility]), presents for fever and rash that began yesterday. Neurology was consulted for potential allergic drug reaction to Lamictal and to provide AED recommendations. Patient says that she woke up yesterday morning feeling "unwell" and noticed she had a "spotty red" rash on her face. Over the next few hours the rash spread to her trunk, back, and b/l extremities. She took her temperature in the afternoon yesterday and recorded a 101.6 F by mouth. Other associated symptoms included swollen eyes, itchiness all over the body, and fatigue. The patient denied headaches, vision changes, weakness, numbness, sob, chest pain, heart palpitations, abdominal pain, or urinary symptoms. When asked about novel exposures to the skin or things she ingested, the patient brought up that she had recently begun taking Lamictal to help control recent seizures. She has no childhood history of seizures, and she says her first seizure was about 6-7 months ago during her cancer treatment. At the time she was started on Vimpat, which made her feel dull, so she was then switched to Keppra. About a month ago, the patient had another seizure while being compliant on Keppra, and so it was decided to add another AED to use in conjunction with Keppra. She began Lamictal at a dose of 25mg on 2022, and was scheduled each week to titrate the dose up 25mg, with a final dose target of 100mg. She was scheduled to up titrate to 75mg on the day of this presentation and had been taking the previous doses as prescribed. She is followed by neurologist Dr. Lorena Madrid at [facility] ([phone]) and requests that she be informed of medication adjustments.      In ED, Lamictal was stopped at presentation and patient reports last dose was 50mg day before presentation. Patient was also started on q4 25mg benadryl.    HPI:  63yo F w h/o HTN, HLD, CAD s/p PCI x1 (2021), Seizure disorder on Keppra and Lamictal, Metastatic Lung Cancer to liver and Brain(s/p gamma knife radiation) presents for Rash. Patient says that rash is associated with fevers developed over the last 24 hours since he last went to see his oncologist in the city. Of note, Patient was recently started on Lamictal ~2 weeks ago ( on an increasing dose regimen every 7 days was on week 3 on dose increase), which is the only change is her daily repertoire. Patient with no other complaints; ROS neg.     In ED, T 99.8, /88, HR91, RR18, SpO2 99% on RA. Labs significant for WBC 4.05, Hgb 9.2, Plt 73, Na 132, Cr 1.6, TBili 1.3, .     Patient admitted for further evaluation.   (08 Dec 2022 05:40)      PAST MEDICAL & SURGICAL HISTORY:  HTN (hypertension)      No significant past surgical history          FAMILY HISTORY:      Social History: (-) x 3    Allergies    Lamictal (Rash)    Intolerances        MEDICATIONS  (STANDING):  aspirin  chewable 81 milliGRAM(s) Oral daily  atorvastatin 80 milliGRAM(s) Oral at bedtime  chlorhexidine 2% Cloths 1 Application(s) Topical <User Schedule>  famotidine    Tablet 20 milliGRAM(s) Oral daily  folic acid 1 milliGRAM(s) Oral daily  furosemide    Tablet 20 milliGRAM(s) Oral daily  heparin   Injectable 5000 Unit(s) SubCutaneous every 12 hours  levETIRAcetam 750 milliGRAM(s) Oral two times a day  levothyroxine 125 MICROGram(s) Oral daily  metoprolol succinate ER 25 milliGRAM(s) Oral daily  prochlorperazine   Tablet 10 milliGRAM(s) Oral three times a day  senna 2 Tablet(s) Oral at bedtime    MEDICATIONS  (PRN):  acetaminophen     Tablet .. 650 milliGRAM(s) Oral every 6 hours PRN Temp greater or equal to 38C (100.4F), Mild Pain (1 - 3), Moderate Pain (4 - 6)  ALPRAZolam 0.25 milliGRAM(s) Oral at bedtime PRN sleep  aluminum hydroxide/magnesium hydroxide/simethicone Suspension 30 milliLiter(s) Oral every 4 hours PRN Dyspepsia  diphenhydrAMINE 25 milliGRAM(s) Oral every 4 hours PRN Rash and/or Itching  melatonin 5 milliGRAM(s) Oral at bedtime PRN Insomnia      Review of systems:    Constitutional: as per HPI  Eyes: Patient reports eye swelling and discomfort, No discharge  ENMT:  No difficulty hearing; No sinus or throat pain  Neck: No pain or stiffness  Respiratory: No cough, wheezing, chills or hemoptysis  Cardiovascular: No chest pain, palpitations, shortness of breath, dyspnea on exertion  Gastrointestinal: No abdominal pain, nausea, vomiting or hematemesis; No diarrhea or constipation.   Genitourinary: No dysuria, frequency, hematuria or incontinence  Neurological: As per HPI  Skin: No rashes or lesions   Endocrine: No heat or cold intolerance; No hair loss  Musculoskeletal: No joint pain or swelling  Psychiatric: No depression, anxiety, mood swings  Heme/Lymph: No easy bruising or bleeding gums    Vital Signs Last 24 Hrs  T(C): 36.8 (08 Dec 2022 09:19), Max: 37.7 (07 Dec 2022 16:36)  T(F): 98.2 (08 Dec 2022 09:19), Max: 99.8 (07 Dec 2022 16:36)  HR: 71 (08 Dec 2022 09:19) (71 - 91)  BP: 107/53 (08 Dec 2022 09:19) (107/53 - 199/88)  BP(mean): --  RR: 20 (08 Dec 2022 09:19) (18 - 20)  SpO2: 99% (08 Dec 2022 09:19) (98% - 99%)    Parameters below as of 08 Dec 2022 09:19  Patient On (Oxygen Delivery Method): room air        Examination:  General:  Appearance is consistent with chronologic age.  Gross skin survey reveals a pruritic, mixed petechial and macular rash, that is present on the face, trunk, UE and LE  Cognitive/Language:  The patient is oriented to person, place, time and date.  Recent and remote memory intact.  Fund of knowledge is intact and normal.  Language with normal repetition, comprehension and naming.  Nondysarthric.    Eyes: Eyes slightly swollen. Intact VA.  EOMI w mild horizontal nystagmus of left eye adduction and right eye abduction. no skew or reported double vision.  PERRL.  No ptosis/weakness of eyelid closure.    Face:  Facial sensation normal V1 - 3, no facial asymmetry.    Ears/Nose/Throat:  Hearing grossly intact b/l.  Palate elevates midline.  Tongue and uvula midline.   Motor examination: Normal tone, large bulk and range of motion.  No tenderness, twitching, tremors or involuntary movements.  Formal Muscle Strength Testing: (MRC grade R/L) 5/5 UE; 5/5 LE.  No observable drift.  Reflexes:   2+ b/l biceps, brachioradialis, patella and Achilles.  Plantar response downgoing b/l.   Sensory examination:  Intact to light touch in all extremities.  Cerebellum:   FTN/HKS intact with normal TRU in all limbs.  No dysmetria or dysdiadokinesia.  Gait narrow based and normal.      Labs:   CBC Full  -  ( 07 Dec 2022 20:10 )  WBC Count : 4.05 K/uL  RBC Count : 3.42 M/uL  Hemoglobin : 9.2 g/dL  Hematocrit : 29.0 %  Platelet Count - Automated : 73 K/uL  Mean Cell Volume : 84.8 fL  Mean Cell Hemoglobin : 26.9 pg  Mean Cell Hemoglobin Concentration : 31.7 g/dL  Auto Neutrophil # : 3.08 K/uL  Auto Lymphocyte # : 0.52 K/uL  Auto Monocyte # : 0.36 K/uL  Auto Eosinophil # : 0.05 K/uL  Auto Basophil # : 0.01 K/uL  Auto Neutrophil % : 76.2 %  Auto Lymphocyte % : 12.8 %  Auto Monocyte % : 8.9 %  Auto Eosinophil % : 1.2 %  Auto Basophil % : 0.2 %        132<L>  |  98  |  23<H>  ----------------------------<  134<H>  3.7   |  22  |  1.6<H>    Ca    7.9<L>      07 Dec 2022 20:10    TPro  5.6<L>  /  Alb  3.4<L>  /  TBili  1.3<H>  /  DBili  x   /  AST  23  /  ALT  11  /  AlkPhos  145<H>      LIVER FUNCTIONS - ( 07 Dec 2022 20:10 )  Alb: 3.4 g/dL / Pro: 5.6 g/dL / ALK PHOS: 145 U/L / ALT: 11 U/L / AST: 23 U/L / GGT: x             Urinalysis Basic - ( 08 Dec 2022 00:32 )    Color: Yellow / Appearance: Slightly Turbid / S.016 / pH: x  Gluc: x / Ketone: Negative  / Bili: Negative / Urobili: 3 mg/dL   Blood: x / Protein: 100 mg/dL / Nitrite: Negative   Leuk Esterase: Moderate / RBC: 26 /HPF / WBC 13 /HPF   Sq Epi: x / Non Sq Epi: 14 /HPF / Bacteria: Few          Neuroimaging:  NCT:     22 @ 14:12       Neurology Consult    Patient is a 62y old  Female who presents with a chief complaint of allergic reaction (08 Dec 2022 08:49)    Consult Hx:   Patient is a 61yo F w/ PMH of HTN, HLD, CAD s/p PCI x1 (2021), seizures on Keppra and Lamictal, Metastatic Lung Cancer to liver and Brain s/p gamma knife radiation (followed at [facility]), presents for fever and rash that began yesterday. Neurology was consulted for potential allergic drug reaction to Lamictal and to provide AED recommendations. Patient says that she woke up yesterday morning feeling "unwell" and noticed she had a "spotty red" rash on her face. Over the next few hours the rash spread to her trunk, back, and b/l extremities. She took her temperature in the afternoon yesterday and recorded a 101.6 F by mouth. Other associated symptoms included swollen eyes, itchiness all over the body, and fatigue. The patient denied headaches, vision changes, weakness, numbness, sob, chest pain, heart palpitations, abdominal pain, or urinary symptoms. When asked about novel exposures to the skin or things she ingested, the patient brought up that she had recently begun taking Lamictal to help control recent seizures. She has no childhood history of seizures, and she says her first seizure was about 6-7 months ago during her cancer treatment. At the time she was started on Vimpat, which made her feel dull, so she was then switched to Keppra. About a month ago, the patient had another seizure while being compliant on Keppra, and so it was decided to add another AED to use in conjunction with Keppra. She began Lamictal at a dose of 25mg on 2022, and was scheduled each week to titrate the dose up 25mg, with a final dose target of 100mg. She was scheduled to up titrate to 75mg on the day of this presentation and had been taking the previous doses as prescribed. She is followed by neurologist Dr. Lorena Madrid at [facility] ([phone]) and requests that she be informed of medication adjustments.      In ED, Lamictal was stopped at presentation and patient reports last dose was 50mg day before presentation. Patient was also started on q4 25mg benadryl.    HPI:  61yo F w h/o HTN, HLD, CAD s/p PCI x1 (2021), Seizure disorder on Keppra and Lamictal, Metastatic Lung Cancer to liver and Brain(s/p gamma knife radiation) presents for Rash. Patient says that rash is associated with fevers developed over the last 24 hours since he last went to see his oncologist in the city. Of note, Patient was recently started on Lamictal ~2 weeks ago ( on an increasing dose regimen every 7 days was on week 3 on dose increase), which is the only change is her daily repertoire. Patient with no other complaints; ROS neg.     In ED, T 99.8, /88, HR91, RR18, SpO2 99% on RA. Labs significant for WBC 4.05, Hgb 9.2, Plt 73, Na 132, Cr 1.6, TBili 1.3, .     Patient admitted for further evaluation.   (08 Dec 2022 05:40)      PAST MEDICAL & SURGICAL HISTORY:  HTN (hypertension)      No significant past surgical history          FAMILY HISTORY:      Social History: (-) x 3    Allergies    Lamictal (Rash)    Intolerances        MEDICATIONS  (STANDING):  aspirin  chewable 81 milliGRAM(s) Oral daily  atorvastatin 80 milliGRAM(s) Oral at bedtime  chlorhexidine 2% Cloths 1 Application(s) Topical <User Schedule>  famotidine    Tablet 20 milliGRAM(s) Oral daily  folic acid 1 milliGRAM(s) Oral daily  furosemide    Tablet 20 milliGRAM(s) Oral daily  heparin   Injectable 5000 Unit(s) SubCutaneous every 12 hours  levETIRAcetam 750 milliGRAM(s) Oral two times a day  levothyroxine 125 MICROGram(s) Oral daily  metoprolol succinate ER 25 milliGRAM(s) Oral daily  prochlorperazine   Tablet 10 milliGRAM(s) Oral three times a day  senna 2 Tablet(s) Oral at bedtime    MEDICATIONS  (PRN):  acetaminophen     Tablet .. 650 milliGRAM(s) Oral every 6 hours PRN Temp greater or equal to 38C (100.4F), Mild Pain (1 - 3), Moderate Pain (4 - 6)  ALPRAZolam 0.25 milliGRAM(s) Oral at bedtime PRN sleep  aluminum hydroxide/magnesium hydroxide/simethicone Suspension 30 milliLiter(s) Oral every 4 hours PRN Dyspepsia  diphenhydrAMINE 25 milliGRAM(s) Oral every 4 hours PRN Rash and/or Itching  melatonin 5 milliGRAM(s) Oral at bedtime PRN Insomnia      Review of systems:    Constitutional: as per HPI  Eyes: Patient reports eye swelling and discomfort, No discharge  ENMT:  No difficulty hearing; No sinus or throat pain  Neck: No pain or stiffness  Respiratory: No cough, wheezing, chills or hemoptysis  Cardiovascular: No chest pain, palpitations, shortness of breath, dyspnea on exertion  Gastrointestinal: No abdominal pain, nausea, vomiting or hematemesis; No diarrhea or constipation.   Genitourinary: No dysuria, frequency, hematuria or incontinence  Neurological: As per HPI  Skin: itchy, flat, red rash that began on the face and spread to trunk, back and b/l extremities. Not painful, no discharge   Endocrine: No heat or cold intolerance; No hair loss  Musculoskeletal: No joint pain or swelling  Psychiatric: No depression, anxiety, mood swings  Heme/Lymph: No easy bruising or bleeding gums    Vital Signs Last 24 Hrs  T(C): 36.8 (08 Dec 2022 09:19), Max: 37.7 (07 Dec 2022 16:36)  T(F): 98.2 (08 Dec 2022 09:19), Max: 99.8 (07 Dec 2022 16:36)  HR: 71 (08 Dec 2022 09:19) (71 - 91)  BP: 107/53 (08 Dec 2022 09:19) (107/53 - 199/88)  BP(mean): --  RR: 20 (08 Dec 2022 09:19) (18 - 20)  SpO2: 99% (08 Dec 2022 09:19) (98% - 99%)    Parameters below as of 08 Dec 2022 09:19  Patient On (Oxygen Delivery Method): room air        Examination:  General:  Appearance is consistent with chronologic age.  Gross skin survey reveals a pruritic, mixed petechial and macular rash, that is present on the face, trunk, UE and LE. B/l LE swelling.  Cognitive/Language:  The patient is oriented to person, place, time and date.  Recent and remote memory intact.  Fund of knowledge is intact and normal.  Language with normal repetition, comprehension and naming.  Nondysarthric.    Eyes: Eyes slightly swollen. Intact VA.  EOMI w mild horizontal nystagmus of left eye adduction and right eye abduction. no skew or reported double vision.  PERRL.  No ptosis/weakness of eyelid closure.    Face:  Facial sensation normal V1 - 3, no facial asymmetry.    Ears/Nose/Throat:  Hearing grossly intact b/l.  Palate elevates midline.  Tongue and uvula midline.   Motor examination: Normal tone, large bulk and range of motion.  No tenderness, twitching, tremors or involuntary movements.  Formal Muscle Strength Testing: (MRC grade R/L) 5/5 UE; 5/5 LE.  No observable drift.  Reflexes:   2+ b/l biceps, brachioradialis, patella and Achilles.  Plantar response downgoing b/l.   Sensory examination:  Intact to light touch in all extremities.  Cerebellum:   FTN/HKS intact with normal TRU in all limbs.  No dysmetria or dysdiadokinesia.  Gait narrow based and normal.      Labs:   CBC Full  -  ( 07 Dec 2022 20:10 )  WBC Count : 4.05 K/uL  RBC Count : 3.42 M/uL  Hemoglobin : 9.2 g/dL  Hematocrit : 29.0 %  Platelet Count - Automated : 73 K/uL  Mean Cell Volume : 84.8 fL  Mean Cell Hemoglobin : 26.9 pg  Mean Cell Hemoglobin Concentration : 31.7 g/dL  Auto Neutrophil # : 3.08 K/uL  Auto Lymphocyte # : 0.52 K/uL  Auto Monocyte # : 0.36 K/uL  Auto Eosinophil # : 0.05 K/uL  Auto Basophil # : 0.01 K/uL  Auto Neutrophil % : 76.2 %  Auto Lymphocyte % : 12.8 %  Auto Monocyte % : 8.9 %  Auto Eosinophil % : 1.2 %  Auto Basophil % : 0.2 %        132<L>  |  98  |  23<H>  ----------------------------<  134<H>  3.7   |  22  |  1.6<H>    Ca    7.9<L>      07 Dec 2022 20:10    TPro  5.6<L>  /  Alb  3.4<L>  /  TBili  1.3<H>  /  DBili  x   /  AST  23  /  ALT  11  /  AlkPhos  145<H>      LIVER FUNCTIONS - ( 07 Dec 2022 20:10 )  Alb: 3.4 g/dL / Pro: 5.6 g/dL / ALK PHOS: 145 U/L / ALT: 11 U/L / AST: 23 U/L / GGT: x             Urinalysis Basic - ( 08 Dec 2022 00:32 )    Color: Yellow / Appearance: Slightly Turbid / S.016 / pH: x  Gluc: x / Ketone: Negative  / Bili: Negative / Urobili: 3 mg/dL   Blood: x / Protein: 100 mg/dL / Nitrite: Negative   Leuk Esterase: Moderate / RBC: 26 /HPF / WBC 13 /HPF   Sq Epi: x / Non Sq Epi: 14 /HPF / Bacteria: Few          [facility]     [facility] @ [facility]12       Neurology Consult    Patient is a 62y old  Female who presents with a chief complaint of allergic reaction (08 Dec 2022 08:49)    Consult Hx:   Patient is a 63yo F w/ PMH of HTN, HLD, CAD s/p PCI x1 (2021), seizures on Keppra and Lamictal, Metastatic Lung Cancer to liver and Brain s/p gamma knife radiation (followed at [facility]), presents for fever and rash that began yesterday. Neurology was consulted for potential allergic drug reaction to Lamictal and to provide AED recommendations. Patient says that she woke up yesterday morning feeling "unwell" and noticed she had a "spotty red" rash on her face. Over the next few hours the rash spread to her trunk, back, and b/l extremities. She took her temperature in the afternoon yesterday and recorded a 101.6 F by mouth. Other associated symptoms included swollen eyes, itchiness all over the body, and fatigue. The patient denied headaches, vision changes, weakness, numbness, sob, chest pain, heart palpitations, abdominal pain, or urinary symptoms. When asked about novel exposures to the skin or things she ingested, the patient brought up that she had recently begun taking Lamictal to help control recent seizures. She has no childhood history of seizures, and she says her first seizure was about 6-7 months ago during her cancer treatment. At the time she was started on Vimpat, which made her feel dull, so she was then switched to Keppra. Patient also felt dull on Keppra and her dose was reduced to 750mg twice daily after a few months. About a month ago, the patient had another seizure while being compliant on the reduced Keppra dose, and so it was decided to add another AED to use in conjunction with Keppra. She began Lamictal at a dose of 25mg on 2022, and was scheduled each week to titrate the dose up 25mg, with a final dose target of 100mg after four weeks. She was entering week three and scheduled to up titrate to 75mg on the day of this presentation, and had been taking the previous doses as prescribed. She is followed by neurologist Dr. Lorena Madrid at [facility] ([phone]) and requests that she be informed of medication adjustments.      In ED, Lamictal was stopped at presentation and patient reports last dose was 50mg day before presentation. Patient was also started on q4 25mg benadryl.    HPI:  63yo F w h/o HTN, HLD, CAD s/p PCI x1 (2021), Seizure disorder on Keppra and Lamictal, Metastatic Lung Cancer to liver and Brain(s/p gamma knife radiation) presents for Rash. Patient says that rash is associated with fevers developed over the last 24 hours since he last went to see his oncologist in the city. Of note, Patient was recently started on Lamictal ~2 weeks ago ( on an increasing dose regimen every 7 days was on week 3 on dose increase), which is the only change is her daily repertoire. Patient with no other complaints; ROS neg.     In ED, T 99.8, /88, HR91, RR18, SpO2 99% on RA. Labs significant for WBC 4.05, Hgb 9.2, Plt 73, Na 132, Cr 1.6, TBili 1.3, .     Patient admitted for further evaluation.   (08 Dec 2022 05:40)      PAST MEDICAL & SURGICAL HISTORY:  HTN (hypertension)      No significant past surgical history          FAMILY HISTORY:      Social History: (-) x 3    Allergies    Lamictal (Rash)    Intolerances        MEDICATIONS  (STANDING):  aspirin  chewable 81 milliGRAM(s) Oral daily  atorvastatin 80 milliGRAM(s) Oral at bedtime  chlorhexidine 2% Cloths 1 Application(s) Topical <User Schedule>  famotidine    Tablet 20 milliGRAM(s) Oral daily  folic acid 1 milliGRAM(s) Oral daily  furosemide    Tablet 20 milliGRAM(s) Oral daily  heparin   Injectable 5000 Unit(s) SubCutaneous every 12 hours  levETIRAcetam 750 milliGRAM(s) Oral two times a day  levothyroxine 125 MICROGram(s) Oral daily  metoprolol succinate ER 25 milliGRAM(s) Oral daily  prochlorperazine   Tablet 10 milliGRAM(s) Oral three times a day  senna 2 Tablet(s) Oral at bedtime    MEDICATIONS  (PRN):  acetaminophen     Tablet .. 650 milliGRAM(s) Oral every 6 hours PRN Temp greater or equal to 38C (100.4F), Mild Pain (1 - 3), Moderate Pain (4 - 6)  ALPRAZolam 0.25 milliGRAM(s) Oral at bedtime PRN sleep  aluminum hydroxide/magnesium hydroxide/simethicone Suspension 30 milliLiter(s) Oral every 4 hours PRN Dyspepsia  diphenhydrAMINE 25 milliGRAM(s) Oral every 4 hours PRN Rash and/or Itching  melatonin 5 milliGRAM(s) Oral at bedtime PRN Insomnia      Review of systems:    Constitutional: as per HPI  Eyes: Patient reports eye swelling and discomfort, No discharge  ENMT:  No difficulty hearing; No sinus or throat pain  Neck: No pain or stiffness  Respiratory: No cough, wheezing, chills or hemoptysis  Cardiovascular: No chest pain, palpitations, shortness of breath, dyspnea on exertion  Gastrointestinal: No abdominal pain, nausea, vomiting or hematemesis; No diarrhea or constipation.   Genitourinary: No dysuria, frequency, hematuria or incontinence  Neurological: As per HPI  Skin: itchy, flat, red rash that began on the face and spread to trunk, back and b/l extremities. Not painful, no discharge   Endocrine: No heat or cold intolerance; No hair loss  Musculoskeletal: No joint pain or swelling  Psychiatric: No depression, anxiety, mood swings  Heme/Lymph: No easy bruising or bleeding gums    Vital Signs Last 24 Hrs  T(C): 36.8 (08 Dec 2022 09:19), Max: 37.7 (07 Dec 2022 16:36)  T(F): 98.2 (08 Dec 2022 09:19), Max: 99.8 (07 Dec 2022 16:36)  HR: 71 (08 Dec 2022 09:19) (71 - 91)  BP: 107/53 (08 Dec 2022 09:19) (107/53 - 199/88)  BP(mean): --  RR: 20 (08 Dec 2022 09:19) (18 - 20)  SpO2: 99% (08 Dec 2022 09:19) (98% - 99%)    Parameters below as of 08 Dec 2022 09:19  Patient On (Oxygen Delivery Method): room air        Examination:  General:  Appearance is consistent with chronologic age.  Gross skin survey reveals a pruritic, mixed petechial and macular rash, that is present on the face, trunk, UE and LE. B/l LE swelling.  Cognitive/Language:  The patient is oriented to person, place, time and date.  Recent and remote memory intact.  Fund of knowledge is intact and normal.  Language with normal repetition, comprehension and naming.  Nondysarthric.    Eyes: Eyes slightly swollen. Intact VA.  EOMI w mild horizontal nystagmus of left eye adduction and right eye abduction. no skew or reported double vision.  PERRL.  No ptosis/weakness of eyelid closure.    Face:  Facial sensation normal V1 - 3, no facial asymmetry.    Ears/Nose/Throat:  Hearing grossly intact b/l.  Palate elevates midline.  Tongue and uvula midline.   Motor examination: Normal tone, large bulk and range of motion.  No tenderness, twitching, tremors or involuntary movements.  Formal Muscle Strength Testing: (MRC grade R/L) 5/5 UE; 5/5 LE.  No observable drift.  Reflexes:   2+ b/l biceps, brachioradialis, patella and Achilles.  Plantar response downgoing b/l.   Sensory examination:  Intact to light touch in all extremities.  Cerebellum:   FTN/HKS intact with normal TRU in all limbs.  No dysmetria or dysdiadokinesia.  Gait narrow based and normal.      Labs:   CBC Full  -  ( 07 Dec 2022 20:10 )  WBC Count : 4.05 K/uL  RBC Count : 3.42 M/uL  Hemoglobin : 9.2 g/dL  Hematocrit : 29.0 %  Platelet Count - Automated : 73 K/uL  Mean Cell Volume : 84.8 fL  Mean Cell Hemoglobin : 26.9 pg  Mean Cell Hemoglobin Concentration : 31.7 g/dL  Auto Neutrophil # : 3.08 K/uL  Auto Lymphocyte # : 0.52 K/uL  Auto Monocyte # : 0.36 K/uL  Auto Eosinophil # : 0.05 K/uL  Auto Basophil # : 0.01 K/uL  Auto Neutrophil % : 76.2 %  Auto Lymphocyte % : 12.8 %  Auto Monocyte % : 8.9 %  Auto Eosinophil % : 1.2 %  Auto Basophil % : 0.2 %        132<L>  |  98  |  23<H>  ----------------------------<  134<H>  3.7   |  22  |  1.6<H>    Ca    7.9<L>      07 Dec 2022 20:10    TPro  5.6<L>  /  Alb  3.4<L>  /  TBili  1.3<H>  /  DBili  x   /  AST  23  /  ALT  11  /  AlkPhos  145<H>  12-07    LIVER FUNCTIONS - ( 07 Dec 2022 20:10 )  Alb: 3.4 g/dL / Pro: 5.6 g/dL / ALK PHOS: 145 U/L / ALT: 11 U/L / AST: 23 U/L / GGT: x             Urinalysis Basic - ( 08 Dec 2022 00:32 )    Color: Yellow / Appearance: Slightly Turbid / S.016 / pH: x  Gluc: x / Ketone: Negative  / Bili: Negative / Urobili: 3 mg/dL   Blood: x / Protein: 100 mg/dL / Nitrite: Negative   Leuk Esterase: Moderate / RBC: 26 /HPF / WBC 13 /HPF   Sq Epi: x / Non Sq Epi: 14 /HPF / Bacteria: Few          Neuroimaging:  NCT:     22 @ 14:12

## 2022-12-08 NOTE — H&P ADULT - HISTORY OF PRESENT ILLNESS
61yo F w h/o HTN, HLD, Seizure disorder on Keppra, Metastatic Lung Cancer, Brain Tumor (s/p gamma knife radiation) presents for Rash. Patient says that rash is associated with fevers developed over the last 2 days since he last went to see his oncologist in the city. Of note, Patient was recenlty started on lamictal ~2 weeks ago, which is the only change is her daily repertoire. Patient with no other complaints; ROS neg.     In ED, T 99.8, /88, HR91, RR18, SpO2 99% on RA. Labs significant for WBC 4.05, Hgb 9.2, Plt 73, Na 132, Cr 1.6, TBili 1.3, .     Patient admitted for further evaluation.   63yo F w h/o HTN, HLD, CAD s/p PCI x1 (Nov 2021), Seizure disorder on Keppra and Lamictal, Metastatic Lung Cancer to liver and Brain(s/p gamma knife radiation) presents for Rash. Patient says that rash is associated with fevers developed over the last 24 hours since he last went to see his oncologist in the city. Of note, Patient was recently started on Lamictal ~2 weeks ago ( on an increasing dose regimen every 7 days was on week 3 on dose increase), which is the only change is her daily repertoire. Patient with no other complaints; ROS neg.     In ED, T 99.8, /88, HR91, RR18, SpO2 99% on RA. Labs significant for WBC 4.05, Hgb 9.2, Plt 73, Na 132, Cr 1.6, TBili 1.3, .     Patient admitted for further evaluation.

## 2022-12-08 NOTE — CONSULT NOTE ADULT - SUBJECTIVE AND OBJECTIVE BOX
ABHILASH KOMAL  62y, Female  Allergy: Lamictal (Rash)      All historical available data reviewed.    HPI:  61yo F w h/o HTN, HLD, CAD s/p PCI x1 (2021), Seizure disorder on Keppra and Lamictal, Metastatic Lung Cancer to liver and Brain(s/p gamma knife radiation) presents for Rash. Patient says that rash is associated with fevers developed over the last 24 hours since he last went to see his oncologist in the city. Of note, Patient was recently started on Lamictal ~2 weeks ago ( on an increasing dose regimen every 7 days was on week 3 on dose increase), which is the only change is her daily repertoire. Patient with no other complaints; ROS neg.     In ED, T 99.8, /88, HR91, RR18, SpO2 99% on RA. Labs significant for WBC 4.05, Hgb 9.2, Plt 73, Na 132, Cr 1.6, TBili 1.3, .     Patient admitted for further evaluation.   (08 Dec 2022 05:40)    FAMILY HISTORY:    PAST MEDICAL & SURGICAL HISTORY:  HTN (hypertension)      No significant past surgical history            VITALS:  T(F): 98.5, Max: 99.8 (22 @ 16:36)  HR: 78  BP: 144/87  RR: 18Vital Signs Last 24 Hrs  T(C): 36.9 (08 Dec 2022 00:14), Max: 37.7 (07 Dec 2022 16:36)  T(F): 98.5 (08 Dec 2022 00:14), Max: 99.8 (07 Dec 2022 16:36)  HR: 78 (08 Dec 2022 06:11) (72 - 91)  BP: 144/87 (08 Dec 2022 06:11) (131/61 - 199/88)  BP(mean): --  RR: 18 (07 Dec 2022 16:36) (18 - 18)  SpO2: 98% (08 Dec 2022 06:11) (98% - 99%)    Parameters below as of 08 Dec 2022 06:11  Patient On (Oxygen Delivery Method): room air        TESTS & MEASUREMENTS:                        9.2    4.05  )-----------( 73       ( 07 Dec 2022 20:10 )             29.0     12-07    132<L>  |  98  |  23<H>  ----------------------------<  134<H>  3.7   |  22  |  1.6<H>    Ca    7.9<L>      07 Dec 2022 20:10    TPro  5.6<L>  /  Alb  3.4<L>  /  TBili  1.3<H>  /  DBili  x   /  AST  23  /  ALT  11  /  AlkPhos  145<H>      LIVER FUNCTIONS - ( 07 Dec 2022 20:10 )  Alb: 3.4 g/dL / Pro: 5.6 g/dL / ALK PHOS: 145 U/L / ALT: 11 U/L / AST: 23 U/L / GGT: x             Urinalysis Basic - ( 08 Dec 2022 00:32 )    Color: Yellow / Appearance: Slightly Turbid / S.016 / pH: x  Gluc: x / Ketone: Negative  / Bili: Negative / Urobili: 3 mg/dL   Blood: x / Protein: 100 mg/dL / Nitrite: Negative   Leuk Esterase: Moderate / RBC: 26 /HPF / WBC 13 /HPF   Sq Epi: x / Non Sq Epi: 14 /HPF / Bacteria: Few          RADIOLOGY & ADDITIONAL TESTS:  Personal review of radiological diagnostics performed  Echo and EKG results noted when applicable.     MEDICATIONS:  acetaminophen     Tablet .. 650 milliGRAM(s) Oral every 6 hours PRN  ALPRAZolam 0.25 milliGRAM(s) Oral at bedtime PRN  aluminum hydroxide/magnesium hydroxide/simethicone Suspension 30 milliLiter(s) Oral every 4 hours PRN  aspirin  chewable 81 milliGRAM(s) Oral daily  atorvastatin 80 milliGRAM(s) Oral at bedtime  chlorhexidine 2% Cloths 1 Application(s) Topical <User Schedule>  diphenhydrAMINE 25 milliGRAM(s) Oral every 4 hours PRN  famotidine    Tablet 20 milliGRAM(s) Oral daily  folic acid 1 milliGRAM(s) Oral daily  furosemide    Tablet 20 milliGRAM(s) Oral daily  heparin   Injectable 5000 Unit(s) SubCutaneous every 12 hours  levETIRAcetam 750 milliGRAM(s) Oral two times a day  levothyroxine 125 MICROGram(s) Oral daily  melatonin 5 milliGRAM(s) Oral at bedtime PRN  metoprolol succinate ER 25 milliGRAM(s) Oral daily  prochlorperazine   Tablet 10 milliGRAM(s) Oral three times a day  senna 2 Tablet(s) Oral at bedtime      ANTIBIOTICS:

## 2022-12-08 NOTE — CONSULT NOTE ADULT - ASSESSMENT
61yo F w h/o HTN, HLD, CAD s/p PCI x1 (Nov 2021), Seizure disorder on Keppra and Lamictal, Metastatic Lung Cancer to liver and Brain(s/p gamma knife radiation) presents for Rash. Patient says that rash is associated with fevers developed over the last 24 hours since he last went to see his oncologist in the city. Of note, Patient was recently started on Lamictal on thanksgiving day ( on an increasing dose regimen every 7 days was on week 3 on dose increase).    IMPRESSION;   Drug reaction/rash secondary ot Lamotrigine. SIRS with isolated fevers.  Not SJS. No mucosal involvement.  WBC 4.0    RECOMMENDATIONS;  Neurology evaluation to readjust seizure meds  No ABx  No steroids  Please do not hesitate to recall ID if any questions arise either through [employer] or through [employer]

## 2022-12-08 NOTE — CONSULT NOTE ADULT - ATTENDING COMMENTS
I have personally seen and examined this patient on 12/9. Plan was discussed with Dr Rivera on 12/8  I have fully participated in the care of this patient.  I have reviewed all pertinent clinical information, including history, physical exam, plan and note.  it was planned to increase Keppra to 1000 mg BID overnight but patient called her outpatient neurologist and recommended to keep it on 750 mg BID. She was not happy this morning that she received 1 gm BID but also expressed her concern about having seizure in the future on Keppra 750 mg BID. I called her neurologist office and left my cellphone and did not receive a  call back yet. Would recommend to discharge the patient on Keppra 1 gm BID and follow up with her outpatient neurologist  I have reviewed all pertinent clinical information and reviewed all relevant imaging and diagnostic studies personally.  Recommendations as above.  Agree with above assessment except as noted.

## 2022-12-08 NOTE — H&P ADULT - ASSESSMENT
63yo F w h/o HTN, HLD, DM, Seizure disorder on Keppra, Metastatic Lung Cancer, Brain Tumor (s/p gamma knife radiation) presents for Rash. and admitted for an allergic reaction     #Allergic Reaction in an Immunocompromised patient  - cont supportive care  - holding Lamictal for now (last dose 12/7 AM)    #H/o Metastatic Lung CA    #Seizure Disorder  - cont home Keppra, holding home Lamictal > Check levels    #HTN  - cont home bp meds    #HLD  - cont home statin    #DMII (outpatient A1c 7.3)  - monitor FS, add basal bolus insulin prn    #Pancytopenia in setting of cancer therapy  - monitor cbc; consider H/O eval    #MARLA on CKD3 vs Progressive CKD3    DVT ppx:  GI ppx:   Diet: AAT  Activity: IAT   63yo F w h/o HTN, HLD, CAD s/p PCI x1 (Nov 2021), Seizure disorder on Keppra and Lamictal, Metastatic Lung Cancer to liver and Brain (s/p gamma knife radiation) presents for Rash and admitted for an allergic reaction evaluation.    #Allergic Reaction vs Drug Rash in an Immunocompromised patient - patient febrile at home (Tmax 101.6), not fevers since admission  - ID eval placed  - cont supportive care  - holding Lamictal for now (last dose 12/7 AM)    #H/o Metastatic Lung CA to liver and brain (s/p gamma knife radiation)  - on keytruda every 21 days and was previously getting chemo but stopped d/t pancytopenia    #Seizure Disorder  - cont home Keppra, holding home Lamictal > Check levels    #CAD s/p PCI  #HTN  - con asa, statin and bb (plavix recently stopped)    #HLD  - cont home statin    #Hypothyroidism  - cont home synthroid and f/u tsh    #DMII (outpatient A1c 7.3)?  - monitor FS, add basal bolus insulin prn    #Pancytopenia in setting of cancer therapy  - monitor cbc; consider H/O eval    #MARLA on CKD3 vs Progressive CKD3    DVT ppx: heparin subq  GI ppx: ppi  Diet: AAT  Activity: IAT    MED REC DONE W PATIENTS LIST IN PHONE   61yo F w h/o HTN, HLD, CAD s/p PCI x1 (Nov 2021), Seizure disorder on Keppra and Lamictal, Metastatic Lung Cancer to liver and Brain (s/p gamma knife radiation) presents for Rash and admitted for an allergic reaction evaluation.    #Allergic Reaction vs Drug Rash in an Immunocompromised patient - patient febrile at home (Tmax 101.6), not fevers since admission  - ID eval placed  - cont supportive care  - holding Lamictal for now (last dose 12/7 AM)    #H/o Metastatic Lung CA to liver and brain (s/p gamma knife radiation)  - on keytruda every 21 days and was previously getting chemo (pemetrexed) but stopped d/t pancytopenia    #Seizure Disorder  - cont home Keppra, holding home Lamictal > Check levels    #CAD s/p PCI  #HTN  - con asa, statin and bb (plavix recently stopped)  - cont lasix for swelling    #HLD  - cont home statin    #Hypothyroidism  - cont home synthroid and f/u tsh    #DMII (outpatient A1c 7.3)?  - monitor FS, add basal bolus insulin prn    #Pancytopenia in setting of cancer therapy  - monitor cbc; consider H/O eval    #Insomnia  - takes xanax qhs prn    #GERD  - cont ppi    #MARLA on CKD3 vs Progressive CKD3  - monitor Cr and avoid nephrotoxic agents    DVT ppx: heparin subq  GI ppx: ppi  Diet: AAT  Activity: IAT    MED REC DONE W PATIENTS LIST IN PHONE

## 2022-12-08 NOTE — H&P ADULT - ATTENDING COMMENTS
63yo F w h/o HTN, HLD, CAD s/p PCI x1 (Nov 2021), Seizure disorder on Keppra and Lamictal, Metastatic Lung Cancer to liver and Brain(s/p gamma knife radiation) presents for Rash.    #Suspected Drug rash d/t lamictal  #Isolated fevers  - med held for now  - Neuro eval for recs  - Benadryl 25 PO PRN for rash sxs  - IVF  - Monitor off abx for now  - BCx/Ucx in lab    #Lung CA to liver and brain (s/p gamma knife radiation)  - on keytruda every 21 days and was previously getting chemo (pemetrexed) but stopped d/t pancytopenia    #Seizure Disorder  - cont home Keppra, holding home Lamictal > Check levels    #CAD s/p PCI  #HTN/HLD  - cont asa, statin and bb (plavix recently stopped)  - cont lasix    #Hypothyroidism  - cont home synthroid and f/u tsh    #DMII (outpatient A1c 7.3)?  - monitor FS, add basal bolus insulin prn    #Pancytopenia in setting of cancer therapy  - monitor cbc    #Insomnia  - takes xanax qhs prn    #GERD  - cont ppi    #MARLA on CKD3 vs Progressive CKD3  - monitor Cr and avoid nephrotoxic agents    DVT ppx: heparin subq    #Progress Note Handoff  Pending (specify): Monitor for further fevers, BCx/UCx, neuro f/u for alternative to lamictal  Family discussion: d/w pt regarding neuro eval for AED  Disposition: Home

## 2022-12-09 ENCOUNTER — TRANSCRIPTION ENCOUNTER (OUTPATIENT)
Age: 62
End: 2022-12-09

## 2022-12-09 VITALS
DIASTOLIC BLOOD PRESSURE: 57 MMHG | TEMPERATURE: 96 F | SYSTOLIC BLOOD PRESSURE: 117 MMHG | HEART RATE: 67 BPM | RESPIRATION RATE: 18 BRPM

## 2022-12-09 LAB
ALBUMIN SERPL ELPH-MCNC: 3.2 G/DL — LOW (ref 3.5–5.2)
ALP SERPL-CCNC: 148 U/L — HIGH (ref 30–115)
ALT FLD-CCNC: 14 U/L — SIGNIFICANT CHANGE UP (ref 0–41)
ANION GAP SERPL CALC-SCNC: 16 MMOL/L — HIGH (ref 7–14)
AST SERPL-CCNC: 24 U/L — SIGNIFICANT CHANGE UP (ref 0–41)
BILIRUB SERPL-MCNC: 0.9 MG/DL — SIGNIFICANT CHANGE UP (ref 0.2–1.2)
BUN SERPL-MCNC: 27 MG/DL — HIGH (ref 10–20)
CALCIUM SERPL-MCNC: 7.9 MG/DL — LOW (ref 8.4–10.5)
CHLORIDE SERPL-SCNC: 101 MMOL/L — SIGNIFICANT CHANGE UP (ref 98–110)
CO2 SERPL-SCNC: 21 MMOL/L — SIGNIFICANT CHANGE UP (ref 17–32)
CREAT SERPL-MCNC: 1.7 MG/DL — HIGH (ref 0.7–1.5)
CULTURE RESULTS: SIGNIFICANT CHANGE UP
EGFR: 34 ML/MIN/1.73M2 — LOW
GLUCOSE SERPL-MCNC: 196 MG/DL — HIGH (ref 70–99)
HCT VFR BLD CALC: 23.7 % — LOW (ref 37–47)
HCV AB S/CO SERPL IA: 0.04 COI — SIGNIFICANT CHANGE UP
HCV AB SERPL-IMP: SIGNIFICANT CHANGE UP
HGB BLD-MCNC: 8 G/DL — LOW (ref 12–16)
MCHC RBC-ENTMCNC: 27.7 PG — SIGNIFICANT CHANGE UP (ref 27–31)
MCHC RBC-ENTMCNC: 33.8 G/DL — SIGNIFICANT CHANGE UP (ref 32–37)
MCV RBC AUTO: 82 FL — SIGNIFICANT CHANGE UP (ref 81–99)
NRBC # BLD: 0 /100 WBCS — SIGNIFICANT CHANGE UP (ref 0–0)
PLATELET # BLD AUTO: 64 K/UL — LOW (ref 130–400)
POTASSIUM SERPL-MCNC: 3.3 MMOL/L — LOW (ref 3.5–5)
POTASSIUM SERPL-SCNC: 3.3 MMOL/L — LOW (ref 3.5–5)
PROT SERPL-MCNC: 5.2 G/DL — LOW (ref 6–8)
RBC # BLD: 2.89 M/UL — LOW (ref 4.2–5.4)
RBC # FLD: 17.3 % — HIGH (ref 11.5–14.5)
SODIUM SERPL-SCNC: 138 MMOL/L — SIGNIFICANT CHANGE UP (ref 135–146)
SPECIMEN SOURCE: SIGNIFICANT CHANGE UP
WBC # BLD: 3.25 K/UL — LOW (ref 4.8–10.8)
WBC # FLD AUTO: 3.25 K/UL — LOW (ref 4.8–10.8)

## 2022-12-09 PROCEDURE — 99239 HOSP IP/OBS DSCHRG MGMT >30: CPT

## 2022-12-09 PROCEDURE — 99221 1ST HOSP IP/OBS SF/LOW 40: CPT

## 2022-12-09 RX ORDER — PYRIDOXINE HCL (VITAMIN B6) 100 MG
100 TABLET ORAL DAILY
Refills: 0 | Status: DISCONTINUED | OUTPATIENT
Start: 2022-12-09 | End: 2022-12-09

## 2022-12-09 RX ORDER — LAMOTRIGINE 25 MG/1
0 TABLET, ORALLY DISINTEGRATING ORAL
Qty: 0 | Refills: 0 | DISCHARGE

## 2022-12-09 RX ORDER — DIPHENHYDRAMINE HCL 50 MG
1 CAPSULE ORAL
Qty: 56 | Refills: 0
Start: 2022-12-09 | End: 2022-12-22

## 2022-12-09 RX ORDER — LEVETIRACETAM 250 MG/1
1 TABLET, FILM COATED ORAL
Qty: 0 | Refills: 0 | DISCHARGE

## 2022-12-09 RX ORDER — PYRIDOXINE HCL (VITAMIN B6) 100 MG
1 TABLET ORAL
Qty: 30 | Refills: 0
Start: 2022-12-09 | End: 2023-01-07

## 2022-12-09 RX ORDER — POTASSIUM CHLORIDE 20 MEQ
20 PACKET (EA) ORAL ONCE
Refills: 0 | Status: DISCONTINUED | OUTPATIENT
Start: 2022-12-09 | End: 2022-12-09

## 2022-12-09 RX ORDER — PROCHLORPERAZINE MALEATE 5 MG
1 TABLET ORAL
Qty: 0 | Refills: 0 | DISCHARGE

## 2022-12-09 RX ORDER — LEVETIRACETAM 250 MG/1
1 TABLET, FILM COATED ORAL
Qty: 60 | Refills: 0
Start: 2022-12-09 | End: 2023-01-07

## 2022-12-09 RX ORDER — POTASSIUM CHLORIDE 20 MEQ
40 PACKET (EA) ORAL ONCE
Refills: 0 | Status: DISCONTINUED | OUTPATIENT
Start: 2022-12-09 | End: 2022-12-09

## 2022-12-09 RX ADMIN — Medication 125 MICROGRAM(S): at 05:53

## 2022-12-09 RX ADMIN — Medication 25 MILLIGRAM(S): at 05:53

## 2022-12-09 RX ADMIN — Medication 81 MILLIGRAM(S): at 11:39

## 2022-12-09 RX ADMIN — Medication 25 MILLIGRAM(S): at 11:42

## 2022-12-09 RX ADMIN — CHLORHEXIDINE GLUCONATE 1 APPLICATION(S): 213 SOLUTION TOPICAL at 05:52

## 2022-12-09 RX ADMIN — HEPARIN SODIUM 5000 UNIT(S): 5000 INJECTION INTRAVENOUS; SUBCUTANEOUS at 05:58

## 2022-12-09 RX ADMIN — Medication 1 MILLIGRAM(S): at 11:39

## 2022-12-09 RX ADMIN — Medication 100 MILLIGRAM(S): at 11:40

## 2022-12-09 RX ADMIN — LEVETIRACETAM 1000 MILLIGRAM(S): 250 TABLET, FILM COATED ORAL at 09:41

## 2022-12-09 NOTE — DISCHARGE NOTE PROVIDER - HOSPITAL COURSE
63yo F w h/o HTN, HLD, CAD s/p PCI x1 (Nov 2021), Seizure disorder on Keppra and Lamictal, Metastatic Lung Cancer to liver and Brain(s/p gamma knife radiation) presents for Rash. Patient says that rash is associated with fevers developed over the last 24 hours since he last went to see his oncologist in the city. Of note, Patient was recently started on Lamictal ~2 weeks ago ( on an increasing dose regimen every 7 days was on week 3 on dose increase), which is the only change is her daily repertoire. Patient with no other complaints; ROS neg.     Hospital Course: Rash resolved with Benadryl. Likely allergic reaction to Lamictal. Neurology saw patient and adjusted medication regimen to keppra 1000 twice daily. Patient stable for discharge    #Allergic Reaction vs Drug Rash in an Immunocompromised patient - patient febrile at home (Tmax 101.6), not fevers since admission  - ID eval placed, do not suspect SJS  - cont supportive care with benadryl q6 PRN itching skin  - holding Lamictal for now (last dose 12/7 AM)    #H/o Metastatic Lung CA to liver and brain (s/p gamma knife radiation)  - on keytruda every 21 days and was previously getting chemo (pemetrexed) but stopped d/t pancytopenia    #Seizure Disorder  - Keppra 1000 twice daily per neuro  - c/w B6  - f/u outpatient neurology    #CAD s/p PCI  #HTN  - con asa, statin and bb (plavix recently stopped)  - cont lasix for swelling    #HLD  - cont home statin    #Hypothyroidism  - cont home synthroid and f/u tsh    #DMII (outpatient A1c 7.3)?  - monitor FS, add basal bolus insulin prn    #Pancytopenia in setting of cancer therapy  - monitor cbc; consider H/O eval  - f/u CBC and BMP in 1week (outpatient)    #Insomnia  - takes xanax qhs prn    #GERD  - cont ppi    #MARLA on CKD3 vs Progressive CKD3  - monitor Cr and avoid nephrotoxic agents

## 2022-12-09 NOTE — DISCHARGE NOTE PROVIDER - NSDCCPCAREPLAN_GEN_ALL_CORE_FT
PRINCIPAL DISCHARGE DIAGNOSIS  Diagnosis: Allergic drug reaction  Assessment and Plan of Treatment: You were found to have a rash that improved with benadryl and is suspected to have been caused by Lamictal. We recommended discontinuing Lamictal and continue only Keppra. Use benadryl as needed every 6 hrs for rash/itchiness   Please follow up with your neurologist within a week.  If you have worsening rash, fever, shortness of breath, or loss of consciousness call 911 and go to the emergency department.  AFTER DISCHARGE PLEASE OBTAIN A COMPLETE BLOOD COUNT AND BASIC METABOLIC PANEL (CBC AND BMP) BECAUSE YOUR CELLS IN YOUR BLOOD WERE NOTED TO BE LOW. Please follow up with your primary care doctor as well within 1-2weeks.      SECONDARY DISCHARGE DIAGNOSES  Diagnosis: Stage 3 chronic kidney disease  Assessment and Plan of Treatment: You were noted to have elevated BUN and Creatinine and believe this is indicative of chronic kidney disease, please stay well hydrated and avoid medications like NSAIDS (advil, ibuprofen). Follow up with your primary care doctor with the labwork (CBC and BMP).

## 2022-12-09 NOTE — DISCHARGE NOTE PROVIDER - CARE PROVIDER_API CALL
ANDREA MALONE  Psychiatry & Neurology  [address] [place] [address]  Phone: [phone]  Fax: [phone]  Follow Up Time: 1 week

## 2022-12-09 NOTE — PROGRESS NOTE ADULT - SUBJECTIVE AND OBJECTIVE BOX
ABHILASH KOMAL  [facility] 050 A ([facility])        Patient was evaluated and examined  by bedside, no active complains, resolved rash, just mild on/off itching         REVIEW OF SYSTEMS:  please see pertinent positives mentioned above, all other 12 ROS negative      T(C): , Max: 38.1 (12-08-22 @ 20:28)  HR: 67 (12-09-22 @ 13:40)  BP: 117/57 (12-09-22 @ 13:40)  RR: 18 (12-09-22 @ 13:40)  SpO2: 99% (12-08-22 @ 20:28)  CAPILLARY BLOOD GLUCOSE    PHYSICAL EXAM:  General: NAD, AAOX3, patient is laying comfortably in bed, obese  HEENT: AT, NC, Supple, NO JVD, NO CB  Lungs: CTA B/L, no wheezing, no rhonchi  CVS: normal S1, S2, RRR, NO M/G/R  Abdomen: soft, bowel sounds present, non-tender, non-distended  Extremities: no edema, no clubbing, no cyanosis, positive peripheral pulses b/l  Neuro: no acute focal neurological deficits  Skin: dry skin on b/l lower ext anterior shins, no rash      LAB  CBC  Date: 12-09-22 @ 12:17  Mean cell Xxketcilkj68.7  Mean cell Hemoglobin Conc33.8  Mean cell Volum 82.0  Platelet count-Automate 64  RBC Count 2.89  Red Cell Distrib Width17.3  WBC Count3.25  % Albumin, Urine--  Hematocrit 23.7  Hemoglobin 8.0  CBC  Date: 12-07-22 @ 20:10  Mean cell Ikglcncvlc44.9  Mean cell Hemoglobin Conc31.7  Mean cell Volum 84.8  Platelet count-Automate 73  RBC Count 3.42  Red Cell Distrib Width16.5  WBC Count4.05  % Albumin, Urine--  Hematocrit 29.0  Hemoglobin 9.2    BMP  12-09-22 @ 11:20  Blood Gas Arterial-Calcium,Ionized--  Blood Urea Nitrogen, Serum 27 mg/dL<H> [10 - 20]  Carbon Dioxide, Serum21 mmol/L [17 - 32]  Chloride, [id number] mmol/L [98 - 110]  Creatinie, Serum1.7 mg/dL<H> [0.7 - 1.5]  Glucose, Bphye594 mg/dL<H> [70 - 99]  Potassium, Serum3.3 mmol/L<L> [3.5 - 5.0]  Sodium, Serum 138 mmol/L [135 - 146]  BMP  12-07-22 @ 20:10  Blood Gas Arterial-Calcium,Ionized--  Blood Urea Nitrogen, Serum 23 mg/dL<H> [10 - 20]  Carbon Dioxide, Serum22 mmol/L [17 - 32]  Chloride, Serum98 mmol/L [98 - 110]  Creatinie, Serum1.6 mg/dL<H> [0.7 - 1.5]  Glucose, Tobxj551 mg/dL<H> [70 - 99]  Potassium, Serum3.7 mmol/L [3.5 - 5.0] [Slighty Hemolyzed use with Caution]  Sodium, Serum 132 mmol/L<L> [135 - 146]              Microbiology:    Culture - Blood (collected 12-07-22 @ 20:46)  Source: .Blood Blood-Peripheral  Preliminary Report (12-09-22 @ 03:02):    No growth to date.    Culture - Blood (collected 12-07-22 @ 20:10)  Source: .Blood Blood-Peripheral  Preliminary Report (12-09-22 @ 03:02):    No growth to date.        Medications:  acetaminophen     Tablet .. 650 milliGRAM(s) Oral every 6 hours PRN  ALPRAZolam 0.25 milliGRAM(s) Oral at bedtime PRN  aluminum hydroxide/magnesium hydroxide/simethicone Suspension 30 milliLiter(s) Oral every 4 hours PRN  aspirin  chewable 81 milliGRAM(s) Oral daily  atorvastatin 80 milliGRAM(s) Oral at bedtime  bisacodyl 10 milliGRAM(s) Oral every 12 hours PRN  chlorhexidine 2% Cloths 1 Application(s) Topical <User Schedule>  diphenhydrAMINE 25 milliGRAM(s) Oral every 4 hours PRN  famotidine    Tablet 20 milliGRAM(s) Oral daily  folic acid 1 milliGRAM(s) Oral daily  furosemide    Tablet 20 milliGRAM(s) Oral daily  heparin   Injectable 5000 Unit(s) SubCutaneous every 12 hours  levETIRAcetam 1000 milliGRAM(s) Oral <User Schedule>  levothyroxine 125 MICROGram(s) Oral daily  melatonin 5 milliGRAM(s) Oral at bedtime PRN  metoprolol succinate ER 25 milliGRAM(s) Oral daily  polyethylene glycol 3350 17 Gram(s) Oral daily  potassium chloride   Powder 20 milliEquivalent(s) Oral once  prochlorperazine   Tablet 10 milliGRAM(s) Oral three times a day  pyridoxine 100 milliGRAM(s) Oral daily  senna 2 Tablet(s) Oral at bedtime        Assessment and Plan:  Patient is a 61y/o Female  w h/o HTN, HLD, CAD s/p PCI x1 (Nov 2021), Seizure disorder on Keppra and Lamictal, Metastatic Lung Cancer to liver and Brain(s/p gamma knife radiation) presents for Rash.    #Suspected Drug rash due to  Lamictal  #Isolated fevers- resolved   - post  Neuro eval rec- d/c lamictal ,start keppra  - Benadryl 25 PO PRN for rash sxs  - post ID eval- no abx, no steroids  - rash has resolved post benadryl tx.       #Lung CA to liver and brain (s/p gamma knife radiation)  - on keytruda every 21 days and was previously getting chemo (pemetrexed) but stopped d/t pancytopenia    #Seizure Disorder  - increased the dose of  home Keppra from 750 mg to 1000mg po twice daily , d/c home Lamictal   -outpatient Neuro f/up     #CAD s/p PCI  #HTN/HLD  - cont asa, statin and bb (plavix recently stopped)  - cont lasix    #Hypothyroidism  - cont home synthroid and f/u tsh    #DMII (outpatient A1c 7.3)?  - monitor FS, add basal bolus insulin prn    #Pancytopenia in setting of cancer therapy  - monitor cbc post d/c home     #Insomnia  - takes xanax qhs prn    #GERD  - cont ppi    # Progressive CKD3  - monitor Cr and avoid nephrotoxic agents  -supplemented potassium level     DVT ppx: heparin subq    #Progress Note Handoff: with resolution of rash , pt. stable for d/c home today with outpatient Neuro and Hemeonc f/up   Family discussion: Patient verbalized good understanding of discharge instructions and agreed with discharge plan.  Disposition: Home_today    Total time spent to complete patient's bedside assessment, review medical chart, discuss discharge  plan of care with covering medical team was more than 35 minutes with >50% of time spent face to face with patient, discussion with patient/family and/or coordination of care

## 2022-12-09 NOTE — DISCHARGE NOTE PROVIDER - NSDCMRMEDTOKEN_GEN_ALL_CORE_FT
aspirin 81 mg oral tablet, chewable: 1 tab(s) orally once a day  atorvastatin 80 mg oral tablet: 1 tab(s) orally once a day  diphenhydrAMINE 25 mg oral capsule: 1 cap(s) orally every 6 hours, As Needed -Rash and/or Itching - for rash - for itching   folic acid 1 mg oral tablet: 1 tab(s) orally once a day  Keppra 1000 mg oral tablet: 1 tab(s) orally 2 times a day   Keytruda:   Lasix 20 mg oral tablet: 1 tab(s) orally once a day  levothyroxine 125 mcg (0.125 mg) oral capsule: 1 cap(s) orally once a day  metoprolol succinate 25 mg oral tablet, extended release: 1 tab(s) orally once a day  midazolam 5 mg/inh nasal spray: 1 puff(s) nasal once  PEMEtrexed:   Pepcid 40 mg oral tablet: 1 tab(s) orally once a day  pyridoxine 100 mg oral tablet: 1 tab(s) orally once a day  Senna 8.6 mg oral tablet: 1 tab(s) orally once a day (at bedtime)  Xanax 0.25 mg oral tablet: 1 tab(s) orally once a day (at bedtime)

## 2022-12-09 NOTE — DISCHARGE NOTE NURSING/CASE MANAGEMENT/SOCIAL WORK - PATIENT PORTAL LINK FT
You can access the FollowMyHealth Patient Portal offered by [facility] by registering at the following website: [email or web address]. By joining [employer]’s FollowMyHealth portal, you will also be able to view your health information using other applications (apps) compatible with our system.

## 2022-12-12 LAB
LAMOTRIGINE SERPL-MCNC: 3.6 UG/ML — SIGNIFICANT CHANGE UP (ref 2–20)
LEVETIRACETAM SERPL-MCNC: 38 UG/ML — SIGNIFICANT CHANGE UP (ref 10–40)

## 2022-12-14 DIAGNOSIS — Z20.822 CONTACT WITH AND (SUSPECTED) EXPOSURE TO COVID-19: ICD-10-CM

## 2022-12-14 DIAGNOSIS — E78.5 HYPERLIPIDEMIA, UNSPECIFIED: ICD-10-CM

## 2022-12-14 DIAGNOSIS — D61.811 OTHER DRUG-INDUCED PANCYTOPENIA: ICD-10-CM

## 2022-12-14 DIAGNOSIS — N18.30 CHRONIC KIDNEY DISEASE, STAGE 3 UNSPECIFIED: ICD-10-CM

## 2022-12-14 DIAGNOSIS — Z87.891 PERSONAL HISTORY OF NICOTINE DEPENDENCE: ICD-10-CM

## 2022-12-14 DIAGNOSIS — T42.6X5A ADVERSE EFFECT OF OTHER ANTIEPILEPTIC AND SEDATIVE-HYPNOTIC DRUGS, INITIAL ENCOUNTER: ICD-10-CM

## 2022-12-14 DIAGNOSIS — R50.9 FEVER, UNSPECIFIED: ICD-10-CM

## 2022-12-14 DIAGNOSIS — C34.90 MALIGNANT NEOPLASM OF UNSPECIFIED PART OF UNSPECIFIED BRONCHUS OR LUNG: ICD-10-CM

## 2022-12-14 DIAGNOSIS — C78.7 SECONDARY MALIGNANT NEOPLASM OF LIVER AND INTRAHEPATIC BILE DUCT: ICD-10-CM

## 2022-12-14 DIAGNOSIS — K21.9 GASTRO-ESOPHAGEAL REFLUX DISEASE WITHOUT ESOPHAGITIS: ICD-10-CM

## 2022-12-14 DIAGNOSIS — L27.0 GENERALIZED SKIN ERUPTION DUE TO DRUGS AND MEDICAMENTS TAKEN INTERNALLY: ICD-10-CM

## 2022-12-14 DIAGNOSIS — Z92.3 PERSONAL HISTORY OF IRRADIATION: ICD-10-CM

## 2022-12-14 DIAGNOSIS — I12.9 HYPERTENSIVE CHRONIC KIDNEY DISEASE WITH STAGE 1 THROUGH STAGE 4 CHRONIC KIDNEY DISEASE, OR UNSPECIFIED CHRONIC KIDNEY DISEASE: ICD-10-CM

## 2022-12-14 DIAGNOSIS — Z92.21 PERSONAL HISTORY OF ANTINEOPLASTIC CHEMOTHERAPY: ICD-10-CM

## 2022-12-14 DIAGNOSIS — G47.00 INSOMNIA, UNSPECIFIED: ICD-10-CM

## 2022-12-14 DIAGNOSIS — N39.0 URINARY TRACT INFECTION, SITE NOT SPECIFIED: ICD-10-CM

## 2022-12-14 DIAGNOSIS — Z98.61 CORONARY ANGIOPLASTY STATUS: ICD-10-CM

## 2022-12-14 DIAGNOSIS — Z79.82 LONG TERM (CURRENT) USE OF ASPIRIN: ICD-10-CM

## 2022-12-14 DIAGNOSIS — N17.9 ACUTE KIDNEY FAILURE, UNSPECIFIED: ICD-10-CM

## 2022-12-14 DIAGNOSIS — I25.10 ATHEROSCLEROTIC HEART DISEASE OF NATIVE CORONARY ARTERY WITHOUT ANGINA PECTORIS: ICD-10-CM

## 2022-12-14 DIAGNOSIS — C79.31 SECONDARY MALIGNANT NEOPLASM OF BRAIN: ICD-10-CM

## 2022-12-14 DIAGNOSIS — T45.1X5A ADVERSE EFFECT OF ANTINEOPLASTIC AND IMMUNOSUPPRESSIVE DRUGS, INITIAL ENCOUNTER: ICD-10-CM
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
## 2022-12-14 DIAGNOSIS — G40.909 EPILEPSY, UNSPECIFIED, NOT INTRACTABLE, WITHOUT STATUS EPILEPTICUS: ICD-10-CM
